# Patient Record
Sex: MALE | Race: WHITE | NOT HISPANIC OR LATINO | Employment: OTHER | URBAN - NONMETROPOLITAN AREA
[De-identification: names, ages, dates, MRNs, and addresses within clinical notes are randomized per-mention and may not be internally consistent; named-entity substitution may affect disease eponyms.]

---

## 2022-02-24 ENCOUNTER — OFFICE VISIT (OUTPATIENT)
Dept: CARDIOLOGY | Facility: CLINIC | Age: 47
End: 2022-02-24

## 2022-02-24 VITALS
DIASTOLIC BLOOD PRESSURE: 96 MMHG | SYSTOLIC BLOOD PRESSURE: 176 MMHG | HEART RATE: 62 BPM | HEIGHT: 69 IN | BODY MASS INDEX: 29.27 KG/M2 | OXYGEN SATURATION: 99 % | WEIGHT: 197.6 LBS

## 2022-02-24 DIAGNOSIS — R00.2 PALPITATIONS: ICD-10-CM

## 2022-02-24 DIAGNOSIS — R06.02 SHORTNESS OF BREATH: ICD-10-CM

## 2022-02-24 DIAGNOSIS — R01.1 HEART MURMUR: ICD-10-CM

## 2022-02-24 DIAGNOSIS — I10 PRIMARY HYPERTENSION: Primary | ICD-10-CM

## 2022-02-24 PROCEDURE — 99204 OFFICE O/P NEW MOD 45 MIN: CPT | Performed by: NURSE PRACTITIONER

## 2022-02-24 RX ORDER — NEBIVOLOL 20 MG/1
TABLET ORAL DAILY
COMMUNITY
Start: 2022-02-18

## 2022-02-24 RX ORDER — LANOLIN ALCOHOL/MO/W.PET/CERES
1000 CREAM (GRAM) TOPICAL DAILY
COMMUNITY

## 2022-02-24 RX ORDER — VALSARTAN 320 MG/1
TABLET ORAL DAILY
COMMUNITY
Start: 2022-02-04

## 2022-02-24 RX ORDER — AMLODIPINE BESYLATE 10 MG/1
TABLET ORAL DAILY
COMMUNITY
Start: 2022-02-17

## 2022-02-24 NOTE — PROGRESS NOTES
Subjective     Pankaj Nicholas is a 46 y.o. male who presents to day for Establish Care (presents for cardiac eval), Hypertension, Heart Murmur, and Shortness of Breath.    CHIEF COMPLIANT  Chief Complaint   Patient presents with   • Establish Care     presents for cardiac eval   • Hypertension   • Heart Murmur   • Shortness of Breath       Active Problems:  Problem List Items Addressed This Visit     None      Visit Diagnoses     Primary hypertension    -  Primary    Relevant Medications    amLODIPine (NORVASC) 10 MG tablet    valsartan (DIOVAN) 320 MG tablet    nebivolol (BYSTOLIC) 20 MG tablet    Other Relevant Orders    Adult Transthoracic Echo Complete W/ Cont if Necessary Per Protocol    Shortness of breath        Relevant Orders    Adult Transthoracic Echo Complete W/ Cont if Necessary Per Protocol    Heart murmur        Relevant Orders    Adult Transthoracic Echo Complete W/ Cont if Necessary Per Protocol    Palpitations        Relevant Orders    Adult Transthoracic Echo Complete W/ Cont if Necessary Per Protocol      Problem list  1.  Hypertension  2.  Heart murmur  3.  Shortness of breath    HPI  HPI  Mr. Nicholas is a 46-year-old male patient who is being seen today to establish care for cardiac evaluation due to hypertension, heart murmur, and shortness of breath.  Patient does have chronic arterial hypertension in which he is currently on valsartan, Bystolic, and amlodipine.  His blood pressure continues to be elevated 176/96 heart rate is 62.  He says prior to the last blood pressure medication titration his blood pressure is 202/1 centimeters at his PCPs.  Proximally 1 week ago she switched him from metoprolol to Bystolic.  I did provide him a log and advised him to follow-up with his PCP within 1 week as well return a log to me.  He does have a murmur in which she has been evaluated in the remote past.  He does not remember any significant valvular disease at that time.  However based on assessment his  murmur is gotten louder.  Therefore a repeat echocardiogram is warranted.  He does have some rare palpitations that mainly occur with exertion in which she has some intermittent racing in his chest.  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  Overall he says he is doing relatively well from a cardiovascular standpoint denies any angina anginal equivalent symptoms.  He denies chest pain, lower extremity edema, fatigue, dizziness, lightheadedness, syncope, PND, orthopnea, or strokelike symptoms.  PRIOR MEDS  Current Outpatient Medications on File Prior to Visit   Medication Sig Dispense Refill   • amLODIPine (NORVASC) 10 MG tablet Daily.     • nebivolol (BYSTOLIC) 20 MG tablet Daily.     • ProAir  (90 Base) MCG/ACT inhaler      • valsartan (DIOVAN) 320 MG tablet Daily.     • vitamin B-12 (CYANOCOBALAMIN) 1000 MCG tablet Take 1,000 mcg by mouth Daily.       No current facility-administered medications on file prior to visit.       ALLERGIES  Patient has no known allergies.    HISTORY  Past Medical History:   Diagnosis Date   • Asthma    • Heart murmur    • Hypertension        Social History     Socioeconomic History   • Marital status:    Tobacco Use   • Smoking status: Current Every Day Smoker     Years: 20.00     Types: Cigarettes   • Smokeless tobacco: Never Used   Substance and Sexual Activity   • Drug use: Never   • Sexual activity: Never       Family History   Problem Relation Age of Onset   • Hypertension Mother    • Hypertension Father        Review of Systems   Constitutional: Negative.  Negative for chills, diaphoresis, fatigue and fever.   HENT: Negative.    Eyes: Negative.  Negative for visual disturbance (wears glasses).   Respiratory: Positive for shortness of breath (on exertion). Negative for apnea, cough, chest tightness and wheezing.         [place]   Cardiovascular: Negative.  Negative for chest pain,  "palpitations (racing only if exerting ) and leg swelling.   Gastrointestinal: Negative.  Negative for abdominal pain, blood in stool, constipation, diarrhea, nausea and vomiting.   Endocrine: Negative.    Genitourinary: Negative.  Negative for hematuria.   Musculoskeletal: Positive for back pain. Negative for arthralgias, myalgias and neck pain.   Skin: Negative.    Allergic/Immunologic: Negative.    Neurological: Negative.  Negative for dizziness, syncope, weakness, light-headedness, numbness and headaches.   Hematological: Negative.  Does not bruise/bleed easily.   Psychiatric/Behavioral: Negative.  Negative for sleep disturbance.       Objective     VITALS: /96 (BP Location: Left arm, Patient Position: Sitting)   Pulse 62   Ht 175.3 cm (69\")   Wt 89.6 kg (197 lb 9.6 oz)   SpO2 99%   BMI 29.18 kg/m²     LABS:   Lab Results (most recent)     None          IMAGING:   No Images in the past 120 days found..    EXAM:  Physical Exam  Vitals and nursing note reviewed.   Constitutional:       Appearance: He is well-developed.   HENT:      Head: Normocephalic and atraumatic.   Eyes:      Pupils: Pupils are equal, round, and reactive to light.   Neck:      Vascular: No carotid bruit or JVD.   Cardiovascular:      Rate and Rhythm: Normal rate and regular rhythm.      Pulses:           Carotid pulses are 2+ on the right side and 2+ on the left side.       Radial pulses are 2+ on the right side and 2+ on the left side.        Posterior tibial pulses are 2+ on the right side and 2+ on the left side.      Heart sounds: Murmur heard.   No gallop.    Pulmonary:      Effort: Pulmonary effort is normal. No respiratory distress.      Breath sounds: Normal breath sounds.   Abdominal:      General: Bowel sounds are normal. There is no distension.      Palpations: Abdomen is soft.      Tenderness: There is no abdominal tenderness.   Musculoskeletal:         General: No swelling. Normal range of motion.      Cervical back: " Neck supple.   Skin:     General: Skin is warm and dry.   Neurological:      Mental Status: He is alert and oriented to person, place, and time.      Cranial Nerves: No cranial nerve deficit.      Sensory: No sensory deficit.   Psychiatric:         Speech: Speech normal.         Behavior: Behavior normal.         Thought Content: Thought content normal.         Judgment: Judgment normal.         Procedure   Procedures       Assessment/Plan    Diagnosis Plan   1. Primary hypertension  Adult Transthoracic Echo Complete W/ Cont if Necessary Per Protocol   2. Shortness of breath  Adult Transthoracic Echo Complete W/ Cont if Necessary Per Protocol   3. Heart murmur  Adult Transthoracic Echo Complete W/ Cont if Necessary Per Protocol   4. Palpitations  Adult Transthoracic Echo Complete W/ Cont if Necessary Per Protocol   1.  Patient's blood pressure is extremely elevated today.  He is a statin medication titrations by his PCP.  I did give him a log to monitor his blood pressure over the next week.  He is going to be following up with her for further titration in 1 week.  If he does not follow-up with her I did recommend he notify me and returning the log for further titration of his antihypertensive therapy.  2.  Due to patient's heart murmur, hypertension, shortness of breath, palpitations I do feel it is appropriate to evaluate her with an echocardiogram.  We will try to obtain his echocardiogram from the remote past for comparison.  3.  Informed of signs and symptoms of ACS and advised to seek emergent treatment for any new worsening symptoms.  Patient also advised sooner follow-up as needed.  Also advised to follow-up with family doctor as needed  This note is dictated utilizing voice recognition software.  Although this record has been proof read, transcriptional errors may still be present. If questions occur regarding the content of this record please do not hesitate to call our office.  I have reviewed and  confirmed the accuracy of the ROS as documented by the MA/LPN/RN NONI Horton    Return in about 3 months (around 5/24/2022), or if symptoms worsen or fail to improve.    Diagnoses and all orders for this visit:    1. Primary hypertension (Primary)  -     Adult Transthoracic Echo Complete W/ Cont if Necessary Per Protocol; Future    2. Shortness of breath  -     Adult Transthoracic Echo Complete W/ Cont if Necessary Per Protocol; Future    3. Heart murmur  -     Adult Transthoracic Echo Complete W/ Cont if Necessary Per Protocol; Future    4. Palpitations  -     Adult Transthoracic Echo Complete W/ Cont if Necessary Per Protocol; Future                 MEDS ORDERED DURING VISIT:  No orders of the defined types were placed in this encounter.          This document has been electronically signed by NONI Horton Jr.  February 24, 2022 13:35 EST

## 2022-02-24 NOTE — PATIENT INSTRUCTIONS
Acute Coronary Syndrome  Acute coronary syndrome (ACS) is a serious problem in which there is suddenly not enough blood and oxygen reaching the heart. ACS can result in chest pain or a heart attack.  This condition is a medical emergency. If you have any symptoms of this condition, get help right away.  What are the causes?  This condition may be caused by:  · A buildup of fat and cholesterol inside the arteries (atherosclerosis). This is the most common cause. The buildup (plaque) can cause blood vessels in the heart (coronary arteries) to become narrow or blocked, which reduces blood flow to the heart. Plaque can also break off and lead to a clot, which can block an artery and cause a heart attack or stroke.  · Sudden tightening of the muscles around the coronary arteries (coronary spasm).  · Tearing of a coronary artery (spontaneous coronary artery dissection).  · Very low blood pressure (hypotension).  · An abnormal heartbeat (arrhythmia).  · Other medical conditions that cause a decrease of oxygen to the heart, such as anemiaorrespiratory failure.  · Using cocaine or methamphetamine.  What increases the risk?  The following factors may make you more likely to develop this condition:  · Age. The risk for ACS increases as you get older.  · History of chest pain, heart attack, peripheral artery disease, or stroke.  · Having taken chemotherapy or immune-suppressing medicines.  · Being male.  · Family history of chest pain, heart disease, or stroke.  · Smoking.  · Not exercising enough.  · Being overweight.  · High cholesterol.  · High blood pressure (hypertension).  · Diabetes.  · Excessive alcohol use.  What are the signs or symptoms?  Common symptoms of this condition include:  · Chest pain. The pain may last a long time, or it may stop and come back (recur). It may feel like:  ? Crushing or squeezing.  ? Tightness, pressure, fullness, or heaviness.  · Arm, neck, jaw, or back pain.  · Heartburn or  indigestion.  · Shortness of breath.  · Nausea.  · Sudden cold sweats.  · Light-headedness.  · Dizziness or passing out.  · Tiredness (fatigue).  Sometimes there are no symptoms.  How is this diagnosed?  This condition may be diagnosed based on:  · Your medical history and symptoms.  · Imaging tests, such as:  ? An electrocardiogram (ECG). This measures the heart's electrical activity.  ? X-rays.  ? CT scan.  ? A coronary angiogram. For this test, dye is injected into the heart arteries and then X-rays are taken.  ? Myocardial perfusion imaging. This test shows how well blood flows through your heart muscle.  · Blood tests. These may be repeated at certain time intervals.  · Exercise stress testing.  · Echocardiogram. This is a test that uses sound waves to produce detailed images of the heart.  How is this treated?  Treatment for this condition may include:  · Oxygen therapy.  · Medicines, such as:  ? Antiplatelet medicines and blood-thinning medicines, such as aspirin. These help prevent blood clots.  ? Medicine that dissolves any blood clots (fibrinolytic therapy).  ? Blood pressure medicines.  ? Nitroglycerin. This helps widen blood vessels to improve blood flow.  ? Pain medicine.  ? Cholesterol-lowering medicine.  · Surgery, such as:  ? Coronary angioplasty with stent placement. This involves placing a small piece of metal that looks like mesh or a spring into a narrow coronary artery. This widens the artery and keeps it open.  ? Coronary artery bypass surgery. This involves taking a section of a blood vessel from a different part of your body and placing it on the blocked coronary artery to allow blood to flow around the blockage.  · Cardiac rehabilitation. This is a program that includes exercise training, education, and counseling to help you recover.  Follow these instructions at home:  Eating and drinking  · Eat a heart-healthy diet that includes whole grains, fruits and vegetables, lean proteins, and  low-fat or nonfat dairy products.  · Limit how much salt (sodium) you eat as told by your health care provider. Follow instructions from your health care provider about any other eating or drinking restrictions, such as limiting foods that are high in fat and processed sugars.  · Use healthy cooking methods such as roasting, grilling, broiling, baking, poaching, steaming, or stir-frying.  · Work with a dietitian to follow a heart-healthy eating plan.  Medicines  · Take over-the-counter and prescription medicines only as told by your health care provider.  · Do not take these medicines unless your health care provider approves:  ? Vitamin supplements that contain vitamin A or vitamin E.  ? NSAIDs, such as ibuprofen, naproxen, or celecoxib.  ? Hormone replacement therapy that contains estrogen.  · If you are taking blood thinners:  ? Talk with your health care provider before you take any medicines that contain aspirin or NSAIDs. These medicines increase your risk for dangerous bleeding.  ? Take your medicine exactly as told, at the same time every day.  ? Avoid activities that could cause injury or bruising, and follow instructions about how to prevent falls.  ? Wear a medical alert bracelet, and carry a card that lists what medicines you take.  Activity  · Follow your cardiac rehabilitation program. Do exercises as told by your physical therapist.  · Ask your health care provider what activities and exercises are safe for you. Follow his or her instructions about lifting, driving, or climbing stairs.  Lifestyle  · Do not use any products that contain nicotine or tobacco, such as cigarettes, e-cigarettes, and chewing tobacco. If you need help quitting, ask your health care provider.  · Do not drink alcohol if your health care provider tells you not to drink.  · If you drink alcohol:  ? Limit how much you have to 0-1 drink a day.  ? Be aware of how much alcohol is in your drink. In the [place] one drink equals one 12 oz  bottle of beer (355 mL), one 5 oz glass of wine (148 mL), or one 1½ oz glass of hard liquor (44 mL).  · Maintain a healthy weight. If you need to lose weight, work with your health care provider to do so safely.  General instructions  · Tell all the health care providers who provide care for you about your heart condition, including your dentist. This may affect the medicines or treatment you receive.  · Manage any other health conditions you have, such as hypertension or diabetes. These conditions affect your heart.  · Pay attention to your mental health. You may be at higher risk for depression.  ? Find ways to manage stress.  ? Talk to your health care provider about depression screening and treatment.  · Keep your vaccinations up to date.  ? Get the flu shot (influenza vaccine) every year.  ? Get the pneumococcal vaccine if you are age 65 or older.  · If directed, monitor your blood pressure at home.  · Keep all follow-up visits as told by your health care provider. This is important.  Contact a health care provider if you:  · Feel overwhelmed or sad.  · Have trouble doing your daily activities.  Get help right away if you:  · Have pain in your chest, neck, arm, jaw, stomach, or back that recurs, and:  ? It lasts for more than a few minutes.  ? It is not relieved by taking the medicineyour health care provider prescribed.  · Have unexplained:  ? Heavy sweating.  ? Heartburn or indigestion.  ? Nausea or vomiting.  ? Shortness of breath.  ? Difficulty breathing.  ? Fatigue.  ? Nervousness or anxiety.  ? Weakness.  ? Diarrhea.  ? Dark stools or blood in your stool.  · Have sudden light-headedness or dizziness.  · Have blood pressure that is higher than 180/120.  · Faint.  · Have thoughts about hurting yourself.  These symptoms may represent a serious problem that is an emergency. Do not wait to see if the symptoms will go away. Get medical help right away. Call your local emergency services (911 in the [place] Do  not drive yourself to the hospital.   Summary  · Acute coronary syndrome (ACS) is when there is not enough blood and oxygen being supplied to the heart. ACS can result in chest pain or a heart attack.  · Acute coronary syndrome is a medical emergency. If you have any symptoms of this condition, get help right away.  · Treatment includes medicines and procedures to open the blocked arteries and restore blood flow.  This information is not intended to replace advice given to you by your health care provider. Make sure you discuss any questions you have with your health care provider.  Document Revised: 05/20/2020 Document Reviewed: 12/30/2019  [email or web address] Patient Education © 2021 [email or web address]      Hypertension, Adult  Hypertension is another name for high blood pressure. High blood pressure forces your heart to work harder to pump blood. This can cause problems over time.  There are two numbers in a blood pressure reading. There is a top number (systolic) over a bottom number (diastolic). It is best to have a blood pressure that is below 120/80. Healthy choices can help lower your blood pressure, or you may need medicine to help lower it.  What are the causes?  The cause of this condition is not known. Some conditions may be related to high blood pressure.  What increases the risk?  · Smoking.  · Having type 2 diabetes mellitus, high cholesterol, or both.  · Not getting enough exercise or physical activity.  · Being overweight.  · Having too much fat, sugar, calories, or salt (sodium) in your diet.  · Drinking too much alcohol.  · Having long-term (chronic) kidney disease.  · Having a family history of high blood pressure.  · Age. Risk increases with age.  · Race. You may be at higher risk if you are .  · Gender. Men are at higher risk than women before age 45. After age 65, women are at higher risk than men.  · Having obstructive sleep apnea.  · Stress.  What are the signs or symptoms?  · High blood  pressure may not cause symptoms. Very high blood pressure (hypertensive crisis) may cause:  ? Headache.  ? Feelings of worry or nervousness (anxiety).  ? Shortness of breath.  ? Nosebleed.  ? A feeling of being sick to your stomach (nausea).  ? Throwing up (vomiting).  ? Changes in how you see.  ? Very bad chest pain.  ? Seizures.  How is this treated?  · This condition is treated by making healthy lifestyle changes, such as:  ? Eating healthy foods.  ? Exercising more.  ? Drinking less alcohol.  · Your health care provider may prescribe medicine if lifestyle changes are not enough to get your blood pressure under control, and if:  ? Your top number is above 130.  ? Your bottom number is above 80.  · Your personal target blood pressure may vary.  Follow these instructions at home:  Eating and drinking    · If told, follow the DASH eating plan. To follow this plan:  ? Fill one half of your plate at each meal with fruits and vegetables.  ? Fill one fourth of your plate at each meal with whole grains. Whole grains include whole-wheat pasta, brown rice, and whole-grain bread.  ? Eat or drink low-fat dairy products, such as skim milk or low-fat yogurt.  ? Fill one fourth of your plate at each meal with low-fat (lean) proteins. Low-fat proteins include fish, chicken without skin, eggs, beans, and tofu.  ? Avoid fatty meat, cured and processed meat, or chicken with skin.  ? Avoid pre-made or processed food.  · Eat less than 1,500 mg of salt each day.  · Do not drink alcohol if:  ? Your doctor tells you not to drink.  ? You are pregnant, may be pregnant, or are planning to become pregnant.  · If you drink alcohol:  ? Limit how much you use to:  § 0-1 drink a day for women.  § 0-2 drinks a day for men.  ? Be aware of how much alcohol is in your drink. In the [place] one drink equals one 12 oz bottle of beer (355 mL), one 5 oz glass of wine (148 mL), or one 1½ oz glass of hard liquor (44 mL).    Lifestyle    · Work with your  doctor to stay at a healthy weight or to lose weight. Ask your doctor what the best weight is for you.  · Get at least 30 minutes of exercise most days of the week. This may include walking, swimming, or biking.  · Get at least 30 minutes of exercise that strengthens your muscles (resistance exercise) at least 3 days a week. This may include lifting weights or doing Pilates.  · Do not use any products that contain nicotine or tobacco, such as cigarettes, e-cigarettes, and chewing tobacco. If you need help quitting, ask your doctor.  · Check your blood pressure at home as told by your doctor.  · Keep all follow-up visits as told by your doctor. This is important.    Medicines  · Take over-the-counter and prescription medicines only as told by your doctor. Follow directions carefully.  · Do not skip doses of blood pressure medicine. The medicine does not work as well if you skip doses. Skipping doses also puts you at risk for problems.  · Ask your doctor about side effects or reactions to medicines that you should watch for.  Contact a doctor if you:  · Think you are having a reaction to the medicine you are taking.  · Have headaches that keep coming back (recurring).  · Feel dizzy.  · Have swelling in your ankles.  · Have trouble with your vision.  Get help right away if you:  · Get a very bad headache.  · Start to feel mixed up (confused).  · Feel weak or numb.  · Feel faint.  · Have very bad pain in your:  ? Chest.  ? Belly (abdomen).  · Throw up more than once.  · Have trouble breathing.  Summary  · Hypertension is another name for high blood pressure.  · High blood pressure forces your heart to work harder to pump blood.  · For most people, a normal blood pressure is less than 120/80.  · Making healthy choices can help lower blood pressure. If your blood pressure does not get lower with healthy choices, you may need to take medicine.  This information is not intended to replace advice given to you by your health  "care provider. Make sure you discuss any questions you have with your health care provider.  Document Revised: 08/28/2019 Document Reviewed: 08/28/2019  [employer] Patient Education © 2021 [employer]      Heart Murmur  A heart murmur is an extra sound that is caused by chaotic blood flow through the valves of the heart. The murmur can be heard as a \"hum\" or \"whoosh\" sound when blood flows through the heart.  There are two types of heart murmurs:  · Innocent (benign) murmurs. Most people with this type of heart murmur do not have a heart problem. Many children have innocent heart murmurs. Your health care provider may suggest some basic tests to find out whether your murmur is an innocent murmur. If an innocent heart murmur is found, there is no need for further tests or treatment and no need to restrict activities or stop playing sports.  · Abnormal murmurs. These types of murmurs can occur in children and adults. Abnormal murmurs may be a sign of a more serious heart condition, such as a heart defect present at birth (congenital defect) or heart valve disease.  What are the causes?    The heart has four areas called chambers. Valves separate the upper and lower chambers from each other (tricuspid valve and mitral valve) and separate the lower chambers of the heart from pathways that lead away from the heart (aortic valve and pulmonary valve).  Normally, the valves open to let blood flow through or out of your heart, and then they shut to keep the blood from flowing backward. This condition is caused by heart valves that are not working properly.  · In children, abnormal heart murmurs are typically caused by congenital defects.  · In adults, abnormal murmurs are usually caused by heart valve problems from disease, infection, or aging.  This condition may also be caused by:  · Pregnancy.  · Fever.  · Overactive thyroid gland.  · Anemia.  · Exercise.  · Rapid growth spurts (in children).  What are the signs or " symptoms?  Innocent murmurs do not cause symptoms, and many people with abnormal murmurs may not have symptoms. If symptoms do develop, they may include:  · Shortness of breath.  · Blue coloring of the skin, especially on the fingertips.  · Chest pain.  · Palpitations, or feeling a fluttering or skipped heartbeat.  · Fainting.  · Persistent cough.  · Getting tired much faster than expected.  · Swelling in the abdomen, feet, or ankles.  How is this diagnosed?  This condition may be diagnosed during a routine physical or other exam. If your health care provider hears a murmur with a stethoscope, he or she will listen for:  · Where the murmur is located in your heart.  · How long the murmur lasts (duration).  · When the murmur is heard during the heartbeat.  · How loud the murmur is. This may help the health care provider figure out what is causing the murmur.  You may be referred to a heart specialist (cardiologist). You may also have other tests, including:  · Electrocardiogram (ECG or EKG). This test measures the electrical activity of your heart.  · Echocardiogram. This test uses high frequency sound waves to make pictures of your heart.  · MRI or chest X-ray.  · Cardiac catheterization. This test looks at blood flow through the arteries around the heart.  For children and adults who have an abnormal heart murmur and want to stay active, it is important to:  · Complete testing.  · Review test results.  · Receive recommendations from your health care provider.  If heart disease is present, it may not be safe to play or be active.  How is this treated?  Heart murmurs themselves do not need treatment. In some cases, a heart murmur may go away on its own. If an underlying problem or disease is causing the murmur, you may need treatment. If treatment is needed, it will depend on the type and severity of the disease or heart problem causing the murmur. Treatment may include:  · Medicine.  · Surgery.  · Dietary and  lifestyle changes.  Follow these instructions at home:  · Talk with your health care provider before participating in sports or other activities that require a lot of effort and energy (are strenuous).  · Learn as much as possible about your condition and any related diseases. Ask your health care provider if you may be at risk for any medical emergencies.  · Talk with your health care provider about what symptoms you should look out for.  · It is up to you to get your test results. Ask your health care provider, or the department that is doing the test, when your results will be ready.  · Keep all follow-up visits as told by your health care provider. This is important.  Contact a health care provider if:  · You are frequently short of breath.  · You feel more tired than usual.  · You are having a hard time keeping up with normal activities or fitness routines.  · You have swelling in your ankles or feet.  · You notice that your heart often beats irregularly.  · You develop any new symptoms.  Get help right away if:  · You have chest pain.  · You are having trouble breathing.  · You feel light-headed or you pass out.  · Your symptoms suddenly get worse.  These symptoms may represent a serious problem that is an emergency. Do not wait to see if the symptoms will go away. Get medical help right away. Call your local emergency services (911 in the [place] Do not drive yourself to the hospital.  Summary  · Normally, the heart valves open to let blood flow through or out of your heart, and then they shut to keep the blood from flowing backward.  · A heart murmur is caused by heart valves that are not working properly.  · You may need treatment if an underlying problem or disease is causing the heart murmur. Treatment may include medicine, surgery, or dietary and lifestyle changes.  · Talk with your health care provider before participating in sports or other activities that require a lot of effort and energy (are  strenuous).  · Talk with your health care provider about what symptoms you should watch out for.  This information is not intended to replace advice given to you by your health care provider. Make sure you discuss any questions you have with your health care provider.  Document Revised: 06/11/2019 Document Reviewed: 06/11/2019  Elsevier Patient Education © 2021 [employer]

## 2022-03-31 ENCOUNTER — HOSPITAL ENCOUNTER (OUTPATIENT)
Dept: CARDIOLOGY | Facility: HOSPITAL | Age: 47
Discharge: HOME | End: 2022-03-31
Admitting: NURSE PRACTITIONER

## 2022-03-31 DIAGNOSIS — I51.7 MODERATE CONCENTRIC LEFT VENTRICULAR HYPERTROPHY: ICD-10-CM

## 2022-03-31 DIAGNOSIS — I10 PRIMARY HYPERTENSION: ICD-10-CM

## 2022-03-31 DIAGNOSIS — R01.1 HEART MURMUR: ICD-10-CM

## 2022-03-31 DIAGNOSIS — I51.89 GRADE II DIASTOLIC DYSFUNCTION: ICD-10-CM

## 2022-03-31 DIAGNOSIS — R06.02 SHORTNESS OF BREATH: ICD-10-CM

## 2022-03-31 DIAGNOSIS — R00.2 PALPITATIONS: ICD-10-CM

## 2022-03-31 PROCEDURE — 93306 TTE W/DOPPLER COMPLETE: CPT | Performed by: INTERNAL MEDICINE

## 2022-03-31 PROCEDURE — 93306 TTE W/DOPPLER COMPLETE: CPT

## 2022-04-10 LAB
BH CV ECHO MEAS - ACS: 2.44 CM
BH CV ECHO MEAS - AO MAX PG: 11 MMHG
BH CV ECHO MEAS - AO MEAN PG: 6.2 MMHG
BH CV ECHO MEAS - AO ROOT DIAM: 3.5 CM
BH CV ECHO MEAS - AO V2 MAX: 165.6 CM/SEC
BH CV ECHO MEAS - AO V2 VTI: 37.4 CM
BH CV ECHO MEAS - AVA(I,D): 3.4 CM2
BH CV ECHO MEAS - EDV(CUBED): 56.3 ML
BH CV ECHO MEAS - EDV(MOD-SP4): 80.8 ML
BH CV ECHO MEAS - EF(MOD-SP4): 56.8 %
BH CV ECHO MEAS - EF_3D-VOL: 63 %
BH CV ECHO MEAS - ESV(CUBED): 5.4 ML
BH CV ECHO MEAS - ESV(MOD-SP4): 34.9 ML
BH CV ECHO MEAS - FS: 54.3 %
BH CV ECHO MEAS - IVS/LVPW: 0.66 CM
BH CV ECHO MEAS - IVSD: 1.67 CM
BH CV ECHO MEAS - LA DIMENSION: 5 CM
BH CV ECHO MEAS - LAT PEAK E' VEL: 6 CM/SEC
BH CV ECHO MEAS - LV DIASTOLIC VOL/BSA (35-75): 39.4 CM2
BH CV ECHO MEAS - LV MASS(C)D: 383.4 GRAMS
BH CV ECHO MEAS - LV MAX PG: 9.1 MMHG
BH CV ECHO MEAS - LV MEAN PG: 4.9 MMHG
BH CV ECHO MEAS - LV SYSTOLIC VOL/BSA (12-30): 17 CM2
BH CV ECHO MEAS - LV V1 MAX: 150.8 CM/SEC
BH CV ECHO MEAS - LV V1 VTI: 35.1 CM
BH CV ECHO MEAS - LVIDD: 3.8 CM
BH CV ECHO MEAS - LVIDS: 1.75 CM
BH CV ECHO MEAS - LVOT AREA: 3.6 CM2
BH CV ECHO MEAS - LVOT DIAM: 2.14 CM
BH CV ECHO MEAS - LVPWD: 2.5 CM
BH CV ECHO MEAS - MED PEAK E' VEL: 4.4 CM/SEC
BH CV ECHO MEAS - MV A MAX VEL: 102.8 CM/SEC
BH CV ECHO MEAS - MV DEC SLOPE: 321 CM/SEC2
BH CV ECHO MEAS - MV E MAX VEL: 118 CM/SEC
BH CV ECHO MEAS - MV E/A: 1.15
BH CV ECHO MEAS - MV MAX PG: 7.6 MMHG
BH CV ECHO MEAS - MV MEAN PG: 2.49 MMHG
BH CV ECHO MEAS - MV P1/2T: 131 MSEC
BH CV ECHO MEAS - MV V2 VTI: 58.7 CM
BH CV ECHO MEAS - MVA(P1/2T): 1.7 CM2
BH CV ECHO MEAS - MVA(VTI): 2.16 CM2
BH CV ECHO MEAS - RVDD: 2.8 CM
BH CV ECHO MEAS - SI(MOD-SP4): 22.4 ML/M2
BH CV ECHO MEAS - SV(LVOT): 126.7 ML
BH CV ECHO MEAS - SV(MOD-SP4): 45.9 ML
BH CV ECHO MEASUREMENTS AVERAGE E/E' RATIO: 22.69
LEFT ATRIUM VOLUME INDEX: 30.4 ML/M2
MAXIMAL PREDICTED HEART RATE: 176 BPM
STRESS TARGET HR: 150 BPM

## 2022-04-19 DIAGNOSIS — I51.89 GRADE II DIASTOLIC DYSFUNCTION: ICD-10-CM

## 2022-04-19 DIAGNOSIS — I51.7 MODERATE CONCENTRIC LEFT VENTRICULAR HYPERTROPHY: Primary | ICD-10-CM

## 2022-04-20 ENCOUNTER — TELEPHONE (OUTPATIENT)
Dept: CARDIOLOGY | Facility: CLINIC | Age: 47
End: 2022-04-20

## 2022-04-20 NOTE — TELEPHONE ENCOUNTER
Cardiac MRI to be scheduled due to Abn Echo. Called patient and notified of testing and Echo results.    Elizabeth SEO

## 2022-06-01 ENCOUNTER — OFFICE VISIT (OUTPATIENT)
Dept: CARDIOLOGY | Facility: CLINIC | Age: 47
End: 2022-06-01

## 2022-06-01 VITALS
OXYGEN SATURATION: 98 % | BODY MASS INDEX: 29.77 KG/M2 | HEIGHT: 69 IN | HEART RATE: 59 BPM | SYSTOLIC BLOOD PRESSURE: 179 MMHG | WEIGHT: 201 LBS | DIASTOLIC BLOOD PRESSURE: 88 MMHG

## 2022-06-01 DIAGNOSIS — R00.2 PALPITATIONS: ICD-10-CM

## 2022-06-01 DIAGNOSIS — I51.89 GRADE II DIASTOLIC DYSFUNCTION: Primary | ICD-10-CM

## 2022-06-01 DIAGNOSIS — I10 PRIMARY HYPERTENSION: ICD-10-CM

## 2022-06-01 DIAGNOSIS — R01.1 HEART MURMUR: ICD-10-CM

## 2022-06-01 DIAGNOSIS — I51.7 MODERATE CONCENTRIC LEFT VENTRICULAR HYPERTROPHY: ICD-10-CM

## 2022-06-01 DIAGNOSIS — R06.02 SHORTNESS OF BREATH: ICD-10-CM

## 2022-06-01 PROCEDURE — 99214 OFFICE O/P EST MOD 30 MIN: CPT | Performed by: NURSE PRACTITIONER

## 2022-06-01 RX ORDER — CHLORTHALIDONE 25 MG/1
25 TABLET ORAL DAILY
Qty: 30 TABLET | Refills: 5 | Status: SHIPPED | OUTPATIENT
Start: 2022-06-01 | End: 2022-06-01

## 2022-06-01 RX ORDER — CHLORTHALIDONE 25 MG/1
25 TABLET ORAL DAILY
Qty: 90 TABLET | Refills: 3 | Status: SHIPPED | OUTPATIENT
Start: 2022-06-01 | End: 2022-09-12

## 2022-06-01 NOTE — PROGRESS NOTES
Subjective     Pankaj Nicholas is a 47 y.o. male who presents to day for Hypertension and 3 month follow up / testing.    CHIEF COMPLIANT  Chief Complaint   Patient presents with   • Hypertension   • 3 month follow up / testing       Active Problems:  Problem List Items Addressed This Visit    None     Visit Diagnoses     Grade II diastolic dysfunction    -  Primary    Relevant Orders    Home Sleep Study    Metanephrines, Frac. Free, Plasma    Metanephrines, Urine, 24 Hour - Urine, Clean Catch    TSH    Moderate concentric left ventricular hypertrophy        Relevant Orders    Home Sleep Study    Metanephrines, Frac. Free, Plasma    Metanephrines, Urine, 24 Hour - Urine, Clean Catch    TSH    Primary hypertension        Relevant Medications    chlorthalidone (HYGROTON) 25 MG tablet    Other Relevant Orders    Home Sleep Study    Metanephrines, Frac. Free, Plasma    Metanephrines, Urine, 24 Hour - Urine, Clean Catch    TSH    Shortness of breath        Relevant Orders    Home Sleep Study    Metanephrines, Frac. Free, Plasma    Metanephrines, Urine, 24 Hour - Urine, Clean Catch    TSH    Heart murmur        Relevant Orders    Home Sleep Study    Metanephrines, Frac. Free, Plasma    Metanephrines, Urine, 24 Hour - Urine, Clean Catch    TSH    Palpitations        Relevant Orders    Home Sleep Study    Metanephrines, Frac. Free, Plasma    Metanephrines, Urine, 24 Hour - Urine, Clean Catch    TSH      Problem list  1.  Hypertension  Echocardiogram 3/22: EF 65 to 70%, grade 2 diastolic dysfunction, moderate to severe dominantly concentric left ventricular hypertrophy with small septal ridge immediately below the LV outflow tract.  Mild amount of Blaine mild MR physiological TR  2.  Heart murmur  3.  Shortness of breath    HPI  HPI  Mr. Nicholas is a 47-year-old male patient who is being followed up today for chronic hypertension.  Patient was noted to have moderate to severe dominantly concentric left ventricular hypertrophy with  "a small \"septal ridge\" immediately below the LV outflow tract.  There is no significant late systolic flow acceleration on Doppler sampling and color Doppler demonstrates no significant LV outflow tract turbulence in systole.  However, M-mode demonstrates a mild degree of systolic anterior motion of the mitral valve.  No focal wall motion abnormalities.  Grade 2 diastolic dysfunction.  Mild to moderate left atrial enlargement.  The right atrium is at the upper limits of normal size.  RV size and function are preserved.  An MRI of the heart has been ordered in which patient is awaiting to have performed.  His blood pressure continues to be elevated today at 179/88 heart rate of 59.  On recheck 164/84.  He is currently on amlodipine, Bystolic, and valsartan.  He does report persistent shortness of breath that mainly occurs with activity such as walking around.  It does not occur at rest.  This stable and unchanged.  He also reports significant fatigue where he is tired all the time and does have daytime napping.  He also reports report of snoring and a strong family history of sleep apnea.  He denies any chest pain, lower extremity edema, palpitations, dizziness, lightheadedness, syncope, PND, orthopnea, or strokelike symptoms.                PRIOR MEDS  Current Outpatient Medications on File Prior to Visit   Medication Sig Dispense Refill   • amLODIPine (NORVASC) 10 MG tablet Daily.     • nebivolol (BYSTOLIC) 20 MG tablet Daily.     • ProAir  (90 Base) MCG/ACT inhaler      • valsartan (DIOVAN) 320 MG tablet Daily.     • vitamin B-12 (CYANOCOBALAMIN) 1000 MCG tablet Take 1,000 mcg by mouth Daily.       No current facility-administered medications on file prior to visit.       ALLERGIES  Patient has no known allergies.    HISTORY  Past Medical History:   Diagnosis Date   • Asthma    • Heart murmur    • Hypertension        Social History     Socioeconomic History   • Marital status:    Tobacco Use   • " "Smoking status: Current Every Day Smoker     Years: 20.00     Types: Cigarettes   • Smokeless tobacco: Never Used   Substance and Sexual Activity   • Drug use: Never   • Sexual activity: Never       Family History   Problem Relation Age of Onset   • Hypertension Mother    • Hypertension Father        Review of Systems   Constitutional: Positive for fatigue. Negative for chills and fever.   HENT: Negative for congestion, rhinorrhea and sore throat.    Respiratory: Positive for shortness of breath. Negative for chest tightness.    Cardiovascular: Negative for chest pain, palpitations and leg swelling.   Gastrointestinal: Negative for constipation, diarrhea and nausea.   Musculoskeletal: Positive for back pain. Negative for arthralgias and neck pain.   Allergic/Immunologic: Negative for environmental allergies and food allergies.   Neurological: Positive for weakness. Negative for dizziness, syncope and light-headedness.   Hematological: Does not bruise/bleed easily.   Psychiatric/Behavioral: Negative for sleep disturbance.       Objective     VITALS: /88 (BP Location: Left arm, Patient Position: Sitting)   Pulse 59   Ht 175.3 cm (69.02\")   Wt 91.2 kg (201 lb)   SpO2 98%   BMI 29.67 kg/m²     LABS:   Lab Results (most recent)     None          IMAGING:   No Images in the past 120 days found..    EXAM:  Physical Exam  Vitals and nursing note reviewed.   Constitutional:       Appearance: He is well-developed.   HENT:      Head: Normocephalic and atraumatic.   Eyes:      Pupils: Pupils are equal, round, and reactive to light.   Neck:      Vascular: No carotid bruit or JVD.   Cardiovascular:      Rate and Rhythm: Normal rate and regular rhythm.      Pulses:           Carotid pulses are 2+ on the right side and 2+ on the left side.       Radial pulses are 2+ on the right side and 2+ on the left side.        Posterior tibial pulses are 2+ on the right side and 2+ on the left side.      Heart sounds: Murmur heard. "     No gallop.   Pulmonary:      Effort: Pulmonary effort is normal. No respiratory distress.      Breath sounds: Normal breath sounds.   Abdominal:      General: Bowel sounds are normal. There is no distension.      Palpations: Abdomen is soft.      Tenderness: There is no abdominal tenderness.   Musculoskeletal:         General: No swelling. Normal range of motion.      Cervical back: Neck supple.   Skin:     General: Skin is warm and dry.   Neurological:      Mental Status: He is alert and oriented to person, place, and time.      Cranial Nerves: No cranial nerve deficit.      Sensory: No sensory deficit.   Psychiatric:         Speech: Speech normal.         Behavior: Behavior normal.         Thought Content: Thought content normal.         Judgment: Judgment normal.         Procedure   Procedures       Assessment & Plan    Diagnosis Plan   1. Grade II diastolic dysfunction  Home Sleep Study    Metanephrines, Frac. Free, Plasma    Metanephrines, Urine, 24 Hour - Urine, Clean Catch    TSH   2. Moderate concentric left ventricular hypertrophy  Home Sleep Study    Metanephrines, Frac. Free, Plasma    Metanephrines, Urine, 24 Hour - Urine, Clean Catch    TSH   3. Primary hypertension  Home Sleep Study    Metanephrines, Frac. Free, Plasma    Metanephrines, Urine, 24 Hour - Urine, Clean Catch    TSH    chlorthalidone (HYGROTON) 25 MG tablet   4. Shortness of breath  Home Sleep Study    Metanephrines, Frac. Free, Plasma    Metanephrines, Urine, 24 Hour - Urine, Clean Catch    TSH   5. Heart murmur  Home Sleep Study    Metanephrines, Frac. Free, Plasma    Metanephrines, Urine, 24 Hour - Urine, Clean Catch    TSH   6. Palpitations  Home Sleep Study    Metanephrines, Frac. Free, Plasma    Metanephrines, Urine, 24 Hour - Urine, Clean Catch    TSH   1.  Patient does have significant chronic arterial hypertension with moderate concentric left ventricular hypertrophy with grade 2 diastolic dysfunction.  Currently on extensive  medication therapy with max doses of amlodipine, Bystolic, and valsartan.  Therefore I like to look for secondary causes of hypertension including pheochromocytoma, hyperthyroidism, and renal artery stenosis as well as sleep apnea.  2.  Due to patient's grade 2 diastolic dysfunction and increased blood pressure we will add chlorthalidone 25 mg daily to see if we can better control his blood pressure.  He is given a log in which she will return in 2 weeks and further blood pressure medication titration will take place at that time.  Or sooner if needed.  3.  Patient is awaiting MRI for abnormal echocardiogram did advise him to continue to follow through with MRI.  4.  I would like to order home sleep study due to uncontrolled hypertension, chronic fatigue, snoring, and strong family history of sleep apnea.  5.  Informed of signs and symptoms of ACS and advised to seek emergent treatment for any new worsening symptoms.  Patient also advised sooner follow-up as needed.  Also advised to follow-up with family doctor as needed  This note is dictated utilizing voice recognition software.  Although this record has been proof read, transcriptional errors may still be present. If questions occur regarding the content of this record please do not hesitate to call our office.  I have reviewed and confirmed the accuracy of the ROS as documented by the MA/LPN/RN NONI Horton    Return in about 3 months (around 9/1/2022), or if symptoms worsen or fail to improve.    Diagnoses and all orders for this visit:    1. Grade II diastolic dysfunction (Primary)  -     Home Sleep Study; Future  -     Cancel: US Renal Transplant; Future  -     Metanephrines, Frac. Free, Plasma; Future  -     Metanephrines, Urine, 24 Hour - Urine, Clean Catch; Future  -     TSH; Future    2. Moderate concentric left ventricular hypertrophy  -     Home Sleep Study; Future  -     Cancel: US Renal Transplant; Future  -     Metanephrines, Frac. Free,  Plasma; Future  -     Metanephrines, Urine, 24 Hour - Urine, Clean Catch; Future  -     TSH; Future    3. Primary hypertension  -     Home Sleep Study; Future  -     Cancel: US Renal Transplant; Future  -     Metanephrines, Frac. Free, Plasma; Future  -     Metanephrines, Urine, 24 Hour - Urine, Clean Catch; Future  -     TSH; Future  -     chlorthalidone (HYGROTON) 25 MG tablet; Take 1 tablet by mouth Daily.  Dispense: 90 tablet; Refill: 3    4. Shortness of breath  -     Home Sleep Study; Future  -     Cancel: US Renal Transplant; Future  -     Metanephrines, Frac. Free, Plasma; Future  -     Metanephrines, Urine, 24 Hour - Urine, Clean Catch; Future  -     TSH; Future    5. Heart murmur  -     Home Sleep Study; Future  -     Cancel: US Renal Transplant; Future  -     Metanephrines, Frac. Free, Plasma; Future  -     Metanephrines, Urine, 24 Hour - Urine, Clean Catch; Future  -     TSH; Future    6. Palpitations  -     Home Sleep Study; Future  -     Cancel: US Renal Transplant; Future  -     Metanephrines, Frac. Free, Plasma; Future  -     Metanephrines, Urine, 24 Hour - Urine, Clean Catch; Future  -     TSH; Future    Other orders  -     Discontinue: chlorthalidone (HYGROTON) 25 MG tablet; Take 1 tablet by mouth Daily.  Dispense: 30 tablet; Refill: 5        Pankaj Nicholas  reports that he has been smoking cigarettes. He has smoked for the past 20.00 years. He has never used smokeless tobacco.. I have educated him on the risk of diseases from using tobacco products .         MEDS ORDERED DURING VISIT:  New Medications Ordered This Visit   Medications   • chlorthalidone (HYGROTON) 25 MG tablet     Sig: Take 1 tablet by mouth Daily.     Dispense:  90 tablet     Refill:  3           This document has been electronically signed by Nilesh Collins Jr., APRN  June 1, 2022 09:51 EDT

## 2022-06-03 ENCOUNTER — HOSPITAL ENCOUNTER (OUTPATIENT)
Dept: MRI IMAGING | Facility: HOSPITAL | Age: 47
End: 2022-06-03

## 2022-07-06 ENCOUNTER — TELEPHONE (OUTPATIENT)
Dept: CARDIOLOGY | Facility: CLINIC | Age: 47
End: 2022-07-06

## 2022-07-06 NOTE — TELEPHONE ENCOUNTER
S/w pt he is coming in next week to  urine collection jug. He will have his labs drown at that time.

## 2022-07-19 ENCOUNTER — TELEPHONE (OUTPATIENT)
Dept: CARDIOLOGY | Facility: CLINIC | Age: 47
End: 2022-07-19

## 2022-07-19 NOTE — TELEPHONE ENCOUNTER
Home Sleep Study      Patient notified of negative HST. He is aware to keep his appointment as scheduled to discuss results entirely.

## 2022-08-03 ENCOUNTER — HOSPITAL ENCOUNTER (OUTPATIENT)
Dept: MRI IMAGING | Facility: HOSPITAL | Age: 47
Discharge: HOME | End: 2022-08-03
Admitting: NURSE PRACTITIONER

## 2022-08-03 PROCEDURE — 75561 CARDIAC MRI FOR MORPH W/DYE: CPT

## 2022-08-03 PROCEDURE — A9577 INJ MULTIHANCE: HCPCS | Performed by: NURSE PRACTITIONER

## 2022-08-03 PROCEDURE — 75561 CARDIAC MRI FOR MORPH W/DYE: CPT | Performed by: NURSE PRACTITIONER

## 2022-08-03 PROCEDURE — 0 GADOBENATE DIMEGLUMINE 529 MG/ML SOLUTION: Performed by: NURSE PRACTITIONER

## 2022-08-03 PROCEDURE — 82565 ASSAY OF CREATININE: CPT

## 2022-08-03 RX ADMIN — GADOBENATE DIMEGLUMINE 20 ML: 529 INJECTION, SOLUTION INTRAVENOUS at 11:39

## 2022-08-10 ENCOUNTER — TELEPHONE (OUTPATIENT)
Dept: CARDIOLOGY | Facility: CLINIC | Age: 47
End: 2022-08-10

## 2022-08-10 NOTE — TELEPHONE ENCOUNTER
Nilesh Collins, Genny Dior MA  MRI is does show some left ventricular hypertrophy.  No acute findings.  Preserved systolic function.  Keep follow-up.        I called the patient and advised of above results . He will monitor blood pressure closely.    Elizabeth SEO

## 2022-08-16 LAB — CREAT BLDA-MCNC: 1.9 MG/DL (ref 0.6–1.3)

## 2022-08-17 ENCOUNTER — TELEPHONE (OUTPATIENT)
Dept: CARDIOLOGY | Facility: CLINIC | Age: 47
End: 2022-08-17

## 2022-08-17 DIAGNOSIS — R79.89 ELEVATED SERUM CREATININE: Primary | ICD-10-CM

## 2022-08-17 DIAGNOSIS — I51.89 GRADE II DIASTOLIC DYSFUNCTION: ICD-10-CM

## 2022-08-17 DIAGNOSIS — I51.7 MODERATE CONCENTRIC LEFT VENTRICULAR HYPERTROPHY: ICD-10-CM

## 2022-08-17 DIAGNOSIS — R06.02 SHORTNESS OF BREATH: ICD-10-CM

## 2022-08-17 NOTE — PROGRESS NOTES
Patient's creatinine had increased from 1.16 previously now to 1.90.  I would recommend discontinuation of the chlorthalidone.  Repeat creatinine in 2 weeks.

## 2022-08-17 NOTE — TELEPHONE ENCOUNTER
Nilesh Collins APRN [place], [place], [place]  Patient's creatinine had increased from 1.16 previously now to 1.90.  I would recommend discontinuation of the chlorthalidone.  Repeat creatinine in 2 weeks.     I called and left a detailed message on voicemail of above results.     Elizabeth SEO

## 2022-09-12 ENCOUNTER — OFFICE VISIT (OUTPATIENT)
Dept: CARDIOLOGY | Facility: CLINIC | Age: 47
End: 2022-09-12

## 2022-09-12 VITALS
HEART RATE: 69 BPM | HEIGHT: 69 IN | OXYGEN SATURATION: 98 % | BODY MASS INDEX: 29.92 KG/M2 | DIASTOLIC BLOOD PRESSURE: 106 MMHG | WEIGHT: 202 LBS | SYSTOLIC BLOOD PRESSURE: 200 MMHG

## 2022-09-12 DIAGNOSIS — I51.89 GRADE II DIASTOLIC DYSFUNCTION: ICD-10-CM

## 2022-09-12 DIAGNOSIS — I51.7 MODERATE CONCENTRIC LEFT VENTRICULAR HYPERTROPHY: ICD-10-CM

## 2022-09-12 DIAGNOSIS — I10 PRIMARY HYPERTENSION: Primary | ICD-10-CM

## 2022-09-12 DIAGNOSIS — R01.1 HEART MURMUR: ICD-10-CM

## 2022-09-12 DIAGNOSIS — R79.89 ELEVATED SERUM CREATININE: ICD-10-CM

## 2022-09-12 PROCEDURE — 93000 ELECTROCARDIOGRAM COMPLETE: CPT | Performed by: NURSE PRACTITIONER

## 2022-09-12 PROCEDURE — 99214 OFFICE O/P EST MOD 30 MIN: CPT | Performed by: NURSE PRACTITIONER

## 2022-09-12 RX ORDER — CLONIDINE HYDROCHLORIDE 0.1 MG/1
0.1 TABLET ORAL ONCE
Status: SHIPPED | OUTPATIENT
Start: 2022-09-12

## 2022-09-12 RX ORDER — NAPROXEN SODIUM 220 MG
220 TABLET ORAL DAILY PRN
COMMUNITY

## 2022-09-12 RX ORDER — HYDRALAZINE HYDROCHLORIDE 25 MG/1
25 TABLET, FILM COATED ORAL 3 TIMES DAILY
Qty: 90 TABLET | Refills: 6 | Status: SHIPPED | OUTPATIENT
Start: 2022-09-12

## 2022-09-12 NOTE — PROGRESS NOTES
Subjective     Pankaj Nicholas is a 47 y.o. male who presents to day for 3 month follow up , Hypertension (Clonidine 0.1 mg given at 9:23 RC RMA), and grade II diastolic dysfunction.    CHIEF COMPLIANT  Chief Complaint   Patient presents with   • 3 month follow up    • Hypertension     Clonidine 0.1 mg given at 9:23 RC RMA   • grade II diastolic dysfunction       Active Problems:  Problem List Items Addressed This Visit    None     Visit Diagnoses     Primary hypertension    -  Primary    Relevant Medications    cloNIDine (CATAPRES) tablet 0.1 mg    hydrALAZINE (APRESOLINE) 25 MG tablet    Other Relevant Orders    ECG 12 Lead    Comprehensive Metabolic Panel    Catecholamines, Fractionated, Plasma    Catecholamines, Fractionated, Urine, 24 Hour - Urine, Clean Catch    CBC & Differential    TSH    Magnesium    Duplex Renal Artery - Bilateral Complete CAR    Grade II diastolic dysfunction        Relevant Orders    ECG 12 Lead    Moderate concentric left ventricular hypertrophy        Relevant Orders    ECG 12 Lead    Heart murmur        Relevant Orders    ECG 12 Lead    Elevated serum creatinine        Relevant Orders    ECG 12 Lead                Problem list  1.  Hypertension  Echocardiogram 3/22: EF 65 to 70%, grade 2 diastolic dysfunction, moderate to severe dominantly concentric left ventricular hypertrophy with small septal ridge immediately below the LV outflow tract.  Mild amount of Blaine mild MR physiological TR  1.2 cardiac MRI: Apical variant hypertrophic cardiomyopathy, EF 63%  2.  Heart murmur  3.  Shortness of breath      HPI  Mr. Pankaj Nicholas, 47-year-old male, medical record number [id number], being followed up today for chronic arterial hypertension. Today, his blood pressure is significantly elevated, 200/106mmHg, heart rate of 69. He is currently taking valsartan, Bystolic, and amlodipine. He denies any associated symptoms with his chronic arterial hypertension.    The patient presents today, stating  his blood pressure at home is not as elevated, running around 180/80-90mmHg. The patient denies difficulty with chest pain, shortness of breath, headaches, dizziness, lightheadedness, syncope. The patient states he was previously taking chlorthalidone, but after having elevated kidney functions, Dr. Du Solano discontinued the medication. The patient reports he does not see a nephrologist. The patient's blood pressure in the office after resting was 169/86mmHg, heart rate of 67. The patient reports he was referred to a cardiologist because he was told he has had a systolic murmur.    Patient denies chest pain, shortness of breath, lower extremity edema, palpitations, fatigue, dizziness, lightheadedness, syncope, PND, orthopnea, or strokelike symptoms.      PRIOR MEDS  Current Outpatient Medications on File Prior to Visit   Medication Sig Dispense Refill   • amLODIPine (NORVASC) 10 MG tablet Daily.     • naproxen sodium (ALEVE) 220 MG tablet Take 220 mg by mouth Daily As Needed.     • nebivolol (BYSTOLIC) 20 MG tablet Daily.     • ProAir  (90 Base) MCG/ACT inhaler      • valsartan (DIOVAN) 320 MG tablet Daily.     • vitamin B-12 (CYANOCOBALAMIN) 1000 MCG tablet Take 1,000 mcg by mouth Daily.       No current facility-administered medications on file prior to visit.       ALLERGIES  Patient has no known allergies.    HISTORY  Past Medical History:   Diagnosis Date   • Asthma    • Heart murmur    • Hypertension        Social History     Socioeconomic History   • Marital status:    Tobacco Use   • Smoking status: Current Every Day Smoker     Years: 20.00     Types: Cigarettes   • Smokeless tobacco: Never Used   Substance and Sexual Activity   • Drug use: Never   • Sexual activity: Never       Family History   Problem Relation Age of Onset   • Hypertension Mother    • Hypertension Father        Review of Systems   Constitutional: Negative for chills, fatigue and fever.   HENT: Negative for  "congestion, rhinorrhea and sore throat.    Respiratory: Negative for chest tightness and shortness of breath.    Cardiovascular: Negative for chest pain, palpitations and leg swelling.   Gastrointestinal: Negative for constipation, diarrhea and nausea.   Musculoskeletal: Positive for back pain. Negative for arthralgias and neck pain.   Allergic/Immunologic: Negative for environmental allergies and food allergies.   Neurological: Negative for dizziness, syncope, weakness and light-headedness.   Hematological: Does not bruise/bleed easily.   Psychiatric/Behavioral: Negative for sleep disturbance.       Objective     VITALS: BP (!) 200/106 (BP Location: Left arm, Patient Position: Sitting)   Pulse 69   Ht 175.3 cm (69.02\")   Wt 91.6 kg (202 lb)   SpO2 98%   BMI 29.82 kg/m²     LABS:   Lab Results (most recent)     None          IMAGING:   No Images in the past 120 days found..    EXAM:  Physical Exam  Vitals and nursing note reviewed.   Constitutional:       Appearance: He is well-developed.   HENT:      Head: Normocephalic and atraumatic.   Eyes:      Pupils: Pupils are equal, round, and reactive to light.   Neck:      Vascular: No carotid bruit or JVD.   Cardiovascular:      Rate and Rhythm: Normal rate and regular rhythm.      Pulses:           Carotid pulses are 2+ on the right side and 2+ on the left side.       Radial pulses are 2+ on the right side and 2+ on the left side.        Posterior tibial pulses are 2+ on the right side and 2+ on the left side.      Heart sounds: Murmur heard.     No gallop.   Pulmonary:      Effort: Pulmonary effort is normal. No respiratory distress.      Breath sounds: Normal breath sounds.   Abdominal:      General: Bowel sounds are normal. There is no distension.      Palpations: Abdomen is soft.      Tenderness: There is no abdominal tenderness.   Musculoskeletal:         General: Swelling (trace) present. Normal range of motion.      Cervical back: Neck supple.   Skin:     " General: Skin is warm and dry.   Neurological:      Mental Status: He is alert and oriented to person, place, and time.      Cranial Nerves: No cranial nerve deficit.      Sensory: No sensory deficit.   Psychiatric:         Speech: Speech normal.         Behavior: Behavior normal.         Thought Content: Thought content normal.         Judgment: Judgment normal.         Procedure     ECG 12 Lead    Date/Time: 9/12/2022 9:14 AM  Performed by: Nilesh Collins APRN  Authorized by: Nilesh Collins APRN   Previous ECG: no previous ECG available  Rhythm: sinus rhythm  Rate: normal  BPM: 66  QRS axis: normal  Other findings: non-specific ST-T wave changes and left ventricular hypertrophy  Comments:  ms  No acute changes               Assessment & Plan    Diagnosis Plan   1. Primary hypertension  ECG 12 Lead    cloNIDine (CATAPRES) tablet 0.1 mg    Comprehensive Metabolic Panel    Catecholamines, Fractionated, Plasma    Catecholamines, Fractionated, Urine, 24 Hour - Urine, Clean Catch    CBC & Differential    TSH    Magnesium    Duplex Renal Artery - Bilateral Complete CAR    hydrALAZINE (APRESOLINE) 25 MG tablet   2. Grade II diastolic dysfunction  ECG 12 Lead   3. Moderate concentric left ventricular hypertrophy  ECG 12 Lead   4. Heart murmur  ECG 12 Lead   5. Elevated serum creatinine  ECG 12 Lead   Plan   1. Patient continues to have chronic arterial hypertension. Patient advised to monitor his blood pressure on a daily basis and report his blood pressure to me weekly. .Added hydralazine 25 mg three times daily sent the 30-day supply to [facility]. Advised the patient to contact me in  mychart if any adjustments are needed in the hydralazine as soon as possible.  Patient will return a log in 2 weeks of his blood pressures.   3.The patient will have laboratory studies obtained including thyroid stimulating hormone, Pheochromocytoma 24-hour urine, and kidney function test to monitor the patient's  thyroid, kidney function levels, and adrenal glands.  4. The patient will have a renal ultrasound to elevate the blockage due to  drug-resistant hypertension  5.  Informed of signs and symptoms of ACS and advised to seek emergent treatment for any new worsening symptoms.  Patient also advised sooner follow-up as needed.  Also advised to follow-up with family doctor as needed  This note is dictated utilizing voice recognition software.  Although this record has been proof read, transcriptional errors may still be present. If questions occur regarding the content of this record please do not hesitate to call our office.  I have reviewed and confirmed the accuracy of the ROS as documented by the MA/LPN/RN NONI Horton.     Return in about 3 months (around 12/12/2022), or if symptoms worsen or fail to improve, for BP logs weekly.    Diagnoses and all orders for this visit:    1. Primary hypertension (Primary)  -     ECG 12 Lead  -     cloNIDine (CATAPRES) tablet 0.1 mg  -     Comprehensive Metabolic Panel; Future  -     Catecholamines, Fractionated, Plasma; Future  -     Catecholamines, Fractionated, Urine, 24 Hour - Urine, Clean Catch; Future  -     CBC & Differential; Future  -     TSH; Future  -     Magnesium; Future  -     Duplex Renal Artery - Bilateral Complete CAR; Future  -     hydrALAZINE (APRESOLINE) 25 MG tablet; Take 1 tablet by mouth 3 (Three) Times a Day.  Dispense: 90 tablet; Refill: 6    2. Grade II diastolic dysfunction  -     ECG 12 Lead    3. Moderate concentric left ventricular hypertrophy  -     ECG 12 Lead    4. Heart murmur  -     ECG 12 Lead    5. Elevated serum creatinine  -     ECG 12 Lead        Pankaj Nicholas  reports that he has been smoking cigarettes. He has smoked for the past 20.00 years. He has never used smokeless tobacco.. I have educated him on the risk of diseases from using tobacco products.            The patient will follow-up in 3 months.      MEDS ORDERED DURING  VISIT:  New Medications Ordered This Visit   Medications   • cloNIDine (CATAPRES) tablet 0.1 mg   • hydrALAZINE (APRESOLINE) 25 MG tablet     Sig: Take 1 tablet by mouth 3 (Three) Times a Day.     Dispense:  90 tablet     Refill:  6           This document has been electronically signed by NONI Horton Jr.  September 19, 2022 00:18 EDT     Transcribed from ambient dictation for NONI Horton by Michaela Zhao .  09/12/22   14:34 EDT    Patient verbalized consent to the visit recording.  I have personally performed the services described in this document as transcribed by the above individual, and it is both accurate and complete.  NONI Horton  9/19/2022  00:19 EDT

## 2022-09-21 ENCOUNTER — TELEPHONE (OUTPATIENT)
Dept: CARDIOLOGY | Facility: CLINIC | Age: 47
End: 2022-09-21

## 2022-09-21 NOTE — TELEPHONE ENCOUNTER
Nilesh Collins, Genny Dior MA  No renal artery stenosis.  Accidental findings of gallstones in the gallbladder      I called patient advised of test results and will forward report to PCP.    Elizabeth SEO

## 2022-09-23 ENCOUNTER — TELEPHONE (OUTPATIENT)
Dept: CARDIOLOGY | Facility: CLINIC | Age: 47
End: 2022-09-23

## 2022-09-23 NOTE — TELEPHONE ENCOUNTER
----- Message from NONI Horton sent at 9/22/2022  4:25 PM EDT -----  Regarding: FW: BP chart  I would like to increase patient's hydralazine to 50 mg 3 times daily have him continue to monitor his blood pressure.    ----- Message -----  From: Aziza Hartley RegSched Rep  Sent: 9/21/2022   5:55 PM EDT  To: NONI Horton, Genny Deng MA  Subject: FW: BP chart                                       ----- Message -----  From: Pankaj Nicholas  Sent: 9/20/2022  11:19 PM EDT  To: Joellen Meraz [facility]  Subject: BP chart                                         Sorry it took some time to get thus to you I had to get a new monitor.

## 2022-11-16 ENCOUNTER — TELEPHONE (OUTPATIENT)
Dept: CARDIOLOGY | Facility: CLINIC | Age: 47
End: 2022-11-16

## 2022-11-16 NOTE — TELEPHONE ENCOUNTER
I called and left a message for patient about upcoming apt and need for labs before this apt.     Elizabeth Deng A

## 2022-11-21 ENCOUNTER — TELEPHONE (OUTPATIENT)
Dept: CARDIOLOGY | Facility: CLINIC | Age: 47
End: 2022-11-21

## 2022-11-21 NOTE — TELEPHONE ENCOUNTER
Caller: Pankaj Nicholas    Relationship: Self    Best call back number: [phone]    What orders are you requesting (i.e. lab or imaging): COPY OF LAB ORDERS    In what timeframe would the patient need to come in: PT IS GOING TO GET LABS DONE TOMORROW 11.22.22    Where will you receive your lab/imaging services: PCP OFFICE:  [facility]  [facility]     Additional notes: PT IS WANTING TO GET LABS DONE FOR UPCOMING APPT COMPLETED TOMORROW BUT WANTS TO HAVE THEM DONE AT HIS PCP OFFICE - UNABLE TO LOCATE FAX NUMBER BUT OFFICE NUMBER IS [phone]

## 2022-12-14 ENCOUNTER — TELEPHONE (OUTPATIENT)
Dept: CARDIOLOGY | Facility: CLINIC | Age: 47
End: 2022-12-14

## 2022-12-14 NOTE — TELEPHONE ENCOUNTER
For the HUB to read to pt:       Left message for pt to confirm appt, if pt calls back please put call through, thank you.

## 2023-04-18 ENCOUNTER — TELEPHONE (OUTPATIENT)
Dept: CARDIOLOGY | Facility: CLINIC | Age: 48
End: 2023-04-18

## 2023-04-18 NOTE — TELEPHONE ENCOUNTER
Caller: ZARINA- [facility]    Relationship: Provider    Best call back number: [phone]    What form or medical record are you requesting: LAST OFFICE NOTE/ EKG    Who is requesting this form or medical record from you: [facility]    How would you like to receive the form or medical records (pick-up, mail, fax): FAX  If fax, what is the fax number: [phone]    Timeframe paperwork needed: ASAP    Additional notes: PT IS IN ER AND THE PRACTICE IS REQUESTING LAST OFFICE NOTE AND LAST EKG

## 2023-04-21 ENCOUNTER — TELEPHONE (OUTPATIENT)
Dept: CARDIOLOGY | Facility: CLINIC | Age: 48
End: 2023-04-21

## 2023-04-21 NOTE — TELEPHONE ENCOUNTER
Caller: Pankaj Nicholas    Relationship to patient: Self    Best call back number:[phone] CALL ANYTIME AND OK TO LEAVE VM    Chief complaint: PATIENT JUST RELEASED FROM [facility] NEEDING A HOSPITAL FOLLOW UP VISIT.    Type of visit: HOSPITAL FOLLOW UP    Requested date: 7-10 DAYS      PLEASE REACH OUT TO PATIENT TO SCHEDULE.  HE LAST SAW KEY CHI JR, NP IN 9-2022

## 2023-04-27 ENCOUNTER — OFFICE VISIT (OUTPATIENT)
Dept: CARDIOLOGY | Facility: CLINIC | Age: 48
End: 2023-04-27
Payer: COMMERCIAL

## 2023-04-27 VITALS
WEIGHT: 200.8 LBS | OXYGEN SATURATION: 97 % | HEART RATE: 64 BPM | DIASTOLIC BLOOD PRESSURE: 68 MMHG | HEIGHT: 69 IN | BODY MASS INDEX: 29.74 KG/M2 | SYSTOLIC BLOOD PRESSURE: 112 MMHG

## 2023-04-27 DIAGNOSIS — I10 PRIMARY HYPERTENSION: Primary | ICD-10-CM

## 2023-04-27 DIAGNOSIS — I51.7 MODERATE CONCENTRIC LEFT VENTRICULAR HYPERTROPHY: ICD-10-CM

## 2023-04-27 DIAGNOSIS — R79.89 ELEVATED SERUM CREATININE: ICD-10-CM

## 2023-04-27 DIAGNOSIS — R01.1 HEART MURMUR: ICD-10-CM

## 2023-04-27 RX ORDER — NITROGLYCERIN 0.4 MG/1
0.4 TABLET SUBLINGUAL
COMMUNITY

## 2023-04-27 RX ORDER — PRASUGREL 10 MG/1
10 TABLET, FILM COATED ORAL DAILY
Qty: 90 TABLET | Refills: 3 | Status: SHIPPED | OUTPATIENT
Start: 2023-04-27

## 2023-04-27 RX ORDER — ASPIRIN 81 MG/1
81 TABLET ORAL DAILY
COMMUNITY

## 2023-04-27 RX ORDER — ATORVASTATIN CALCIUM 40 MG/1
40 TABLET, FILM COATED ORAL DAILY
COMMUNITY
End: 2023-04-27 | Stop reason: SDUPTHER

## 2023-04-27 RX ORDER — HYDRALAZINE HYDROCHLORIDE 25 MG/1
75 TABLET, FILM COATED ORAL 3 TIMES DAILY
Qty: 810 TABLET | Refills: 3 | Status: SHIPPED | OUTPATIENT
Start: 2023-04-27

## 2023-04-27 RX ORDER — ATORVASTATIN CALCIUM 40 MG/1
40 TABLET, FILM COATED ORAL DAILY
Qty: 90 TABLET | Refills: 3 | Status: SHIPPED | OUTPATIENT
Start: 2023-04-27

## 2023-04-27 RX ORDER — PRASUGREL 10 MG/1
10 TABLET, FILM COATED ORAL DAILY
COMMUNITY
End: 2023-04-27 | Stop reason: SDUPTHER

## 2023-04-27 RX ORDER — CELECOXIB 200 MG/1
200 CAPSULE ORAL DAILY
Qty: 90 CAPSULE | Refills: 1 | Status: SHIPPED | OUTPATIENT
Start: 2023-04-27

## 2023-04-27 NOTE — PROGRESS NOTES
Subjective     Pankaj Nicholas is a 48 y.o. male who presents to [facility] for [facility] ER follow up  and Hypertension.    CHIEF COMPLIANT  Chief Complaint   Patient presents with   • [facility] ER follow up    • Hypertension       Active Problems:  Problem List Items Addressed This Visit    None  Visit Diagnoses     Primary hypertension    -  Primary    Relevant Medications    hydrALAZINE (APRESOLINE) 25 MG tablet    Other Relevant Orders    Comprehensive Metabolic Panel    Lipid Panel    Moderate concentric left ventricular hypertrophy        Relevant Medications    nitroglycerin (NITROSTAT) 0.4 MG SL tablet    prasugrel (EFFIENT) 10 MG tablet    Other Relevant Orders    Comprehensive Metabolic Panel    Lipid Panel    Heart murmur        Relevant Orders    Comprehensive Metabolic Panel    Lipid Panel    Elevated serum creatinine        Relevant Orders    Comprehensive Metabolic Panel    Lipid Panel      Problem list  1.  Hypertension  Echocardiogram 3/22: EF 65 to 70%, grade 2 diastolic dysfunction, moderate to severe dominantly concentric left ventricular hypertrophy with small septal ridge immediately below the LV outflow tract.  Mild amount of Blaine mild MR physiological TR  1.2 cardiac MRI: Apical variant hypertrophic cardiomyopathy, EF 63%  2.  Heart murmur  3.  Shortness of breath  4.  Left heart catheterization 4/2023 left main patent, LAD proximal 95%, OM1 70%, circumflex 30 to 40%, RCA mid mild disease, LVEDP 36, stent placement to the LAD and OM1    HPI  Hypertension  Associated symptoms include chest pain (Has bewen to [facility] twice recently, denies CP since most recent release ). Pertinent negatives include no headaches, palpitations or shortness of breath.     Panakj Nicholas is a 58-year-old patient is being followed up today status post hospital follow-up. He did go under left heart catheterization during the first visit. In which he got a stent to his RCA and a stent to his OM. He had recurrent chest pain and sought care  for a second time and went under repeat left heart catheterization, which did not show any hemodynamically significant stenosis. He also had a repeat echocardiogram that showed an EF of 60 to 65 percent, diastolic dysfunction, mild TR and trace TR. He is on atorvastatin for high intensity statin therapy, Effient, and aspirin for dual antiplatelet therapy.     He does have chronic arterial hypertension in which is on amlodipine, hydralazine, Bystolic, and valsartan. Today, his blood pressure is well controlled at 112/68 mmHg. Heart rate of 64 beats per minute. He reports his blood pressure systolic is usually around 130 mmHg or 140 mmHg.    He denies shortness of breath or chest pains. He denies any swelling. He reports he has constant back pain, some days hurt worse then others. He adds he usually gets up and takes 2 Aleve's.    He reports he was doing good then one day he went into work and had a lot of pain. He adds when he went to the doctor they did not find anything. He notes the doctor looked over his kidneys as well. He adds they started him on atorvastatin. He states he discontinued the Brilinta because they believed it was part of the problem. They switched him to Effient. He states he is on nitroglycerin.    The patient maintains his wrist is still stiff but overall doing good. He denies any numbness or tingling. He notes everything tingles until he takes the band off.    He states when he went to the ER they had admitted him because his troponin level was elevated.  He had a repeat left heart catheterization that showed patent stents.    Patient denies any chest pain, shortness of breath, lower extremity edema, palpitations, fatigue, dizziness, lightheadedness, PND, orthopnea, or strokelike symptoms.    PRIOR MEDS  Current Outpatient Medications on File Prior to Visit   Medication Sig Dispense Refill   • amLODIPine (NORVASC) 10 MG tablet Daily.     • aspirin 81 MG EC tablet Take 1 tablet by mouth Daily.      • nebivolol (BYSTOLIC) 20 MG tablet Daily.     • nitroglycerin (NITROSTAT) 0.4 MG SL tablet Place 1 tablet under the tongue Every 5 (Five) Minutes As Needed for Chest Pain. Take no more than 3 doses in 15 minutes.     • ProAir  (90 Base) MCG/ACT inhaler      • valsartan (DIOVAN) 320 MG tablet Daily.     • [DISCONTINUED] atorvastatin (LIPITOR) 40 MG tablet Take 1 tablet by mouth Daily.     • [DISCONTINUED] hydrALAZINE (APRESOLINE) 25 MG tablet Take 1 tablet by mouth 3 (Three) Times a Day. (Patient taking differently: Take 1 tablet by mouth 3 (Three) Times a Day. 3 tabs TID) 90 tablet 6   • [DISCONTINUED] prasugrel (EFFIENT) 10 MG tablet Take 1 tablet by mouth Daily.     • [DISCONTINUED] naproxen sodium (ALEVE) 220 MG tablet Take 1 tablet by mouth Daily As Needed.     • [DISCONTINUED] vitamin B-12 (CYANOCOBALAMIN) 1000 MCG tablet Take 1 tablet by mouth Daily.       Current Facility-Administered Medications on File Prior to Visit   Medication Dose Route Frequency Provider Last Rate Last Admin   • cloNIDine (CATAPRES) tablet 0.1 mg  0.1 mg Oral Once Nilesh Collins APRN           ALLERGIES  Patient has no known allergies.    HISTORY  Past Medical History:   Diagnosis Date   • Asthma    • Heart murmur    • Hypertension        Social History     Socioeconomic History   • Marital status:    Tobacco Use   • Smoking status: Every Day     Years: 20.00     Types: Cigarettes   • Smokeless tobacco: Never   Substance and Sexual Activity   • Drug use: Never   • Sexual activity: Never        Family History   Problem Relation Age of Onset   • Hypertension Mother    • Hypertension Father        Review of Systems   Constitutional: Negative for fatigue.   HENT: Negative for congestion, rhinorrhea and sore throat.    Respiratory: Negative for chest tightness and shortness of breath.    Cardiovascular: Positive for chest pain (Has bewen to [facility] twice recently, denies CP since most recent release ). Negative for  "palpitations and leg swelling.   Gastrointestinal: Negative.    Genitourinary: Negative.    Neurological: Negative for dizziness, syncope, weakness, light-headedness, numbness and headaches.   Hematological: Does not bruise/bleed easily.   Psychiatric/Behavioral: Negative for sleep disturbance.       Objective     VITALS: /68   Pulse 64   Ht 175.3 cm (69\")   Wt 91.1 kg (200 lb 12.8 oz)   SpO2 97%   BMI 29.65 kg/m²     LABS:   Lab Results (most recent)     None        Blood work showed CBC normal, troponins originally started at 42 and dropped down to 39. kidney function originally was 1.23 went up to 1.41, following the heart catheterization. Triglycerides 107, cholesterol 25, .  GFR normal, calcium, magnesium.      IMAGING:   No Images in the past 120 days found..    Repeat heart catheterization showed 95 percent in the LAD, 70 percent in the OM. Left main normal, circumflex was good. Right coronary artery showed mild disease, nothing major in the midportion, high grade stenosis.    Repeat echocardiogram that showed an EF of 60 to 65 percent, diastolic dysfunction, mild TR and trace TR.    EXAM:  Physical Exam  Vitals and nursing note reviewed.   Constitutional:       Appearance: He is well-developed.   HENT:      Head: Normocephalic and atraumatic.   Eyes:      Pupils: Pupils are equal, round, and reactive to light.   Neck:      Vascular: No carotid bruit or JVD.   Cardiovascular:      Rate and Rhythm: Normal rate and regular rhythm.      Pulses:           Carotid pulses are 2+ on the right side and 2+ on the left side.       Radial pulses are 2+ on the right side and 2+ on the left side.        Posterior tibial pulses are 2+ on the right side and 2+ on the left side.      Heart sounds: Normal heart sounds. No murmur heard.    No gallop.   Pulmonary:      Effort: Pulmonary effort is normal. No respiratory distress.      Breath sounds: Normal breath sounds.   Abdominal:      General: Bowel sounds " are normal. There is no distension.      Palpations: Abdomen is soft.      Tenderness: There is no abdominal tenderness.   Musculoskeletal:         General: No swelling. Normal range of motion.      Cervical back: Neck supple.   Skin:     General: Skin is warm and dry.   Neurological:      Mental Status: He is alert and oriented to person, place, and time.      Cranial Nerves: No cranial nerve deficit.      Sensory: No sensory deficit.   Psychiatric:         Speech: Speech normal.         Behavior: Behavior normal.         Thought Content: Thought content normal.         Judgment: Judgment normal.         Procedure   Procedures       Assessment & Plan    Diagnosis Plan   1. Primary hypertension  Comprehensive Metabolic Panel    Lipid Panel    hydrALAZINE (APRESOLINE) 25 MG tablet      2. Moderate concentric left ventricular hypertrophy  Comprehensive Metabolic Panel    Lipid Panel      3. Heart murmur  Comprehensive Metabolic Panel    Lipid Panel      4. Elevated serum creatinine  Comprehensive Metabolic Panel    Lipid Panel      PLAN  1. The patient's cardiac medications were discussed at length during this visit.   2. The patient was prescribed Celebrex 200 mg daily. He was advised it is not ideal to take it, and only take as needed for back pain.  3. The patient's Effient, atorvastatin, and hydralazine was refilled.  We did discuss the importance of dual antiplatelet therapy due to his placement of 2 stents.  4. An order has been placed for blood work in order to check his kidney function and lipid panel.  5. He was advised to not lift more then a gallon of milk until 04/29/2023 with his right wrist.  Right radial cath site is without hematoma or exudate.  Right radial pulse is palpable proximal and distal to the insertion site.  Patient denies any loss of sensation, numbness, or tingling.  Capillary refill within 2 seconds.  6.  Informed of signs and symptoms of ACS and advised to seek emergent treatment for any  new worsening symptoms.  Patient also advised sooner follow-up as needed.  Also advised to follow-up with family doctor as needed  This note is dictated utilizing voice recognition software.  Although this record has been proof read, transcriptional errors may still be present. If questions occur regarding the content of this record please do not hesitate to call our office.  I have reviewed and confirmed the accuracy of the ROS as documented by the MA/LPN/RN NONI Horton  Return in about 6 months (around 10/27/2023), or if symptoms worsen or fail to improve.    Diagnoses and all orders for this visit:    1. Primary hypertension (Primary)  -     Comprehensive Metabolic Panel; Future  -     Lipid Panel; Future  -     hydrALAZINE (APRESOLINE) 25 MG tablet; Take 3 tablets by mouth 3 (Three) Times a Day. 3 tabs TID  Dispense: 810 tablet; Refill: 3    2. Moderate concentric left ventricular hypertrophy  -     Comprehensive Metabolic Panel; Future  -     Lipid Panel; Future    3. Heart murmur  -     Comprehensive Metabolic Panel; Future  -     Lipid Panel; Future    4. Elevated serum creatinine  -     Comprehensive Metabolic Panel; Future  -     Lipid Panel; Future    Other orders  -     celecoxib (CeleBREX) 200 MG capsule; Take 1 capsule by mouth Daily.  Dispense: 90 capsule; Refill: 1  -     prasugrel (EFFIENT) 10 MG tablet; Take 1 tablet by mouth Daily.  Dispense: 90 tablet; Refill: 3  -     atorvastatin (LIPITOR) 40 MG tablet; Take 1 tablet by mouth Daily.  Dispense: 90 tablet; Refill: 3        Pankaj Nicholas  reports that he has been smoking cigarettes. He has never used smokeless tobacco.. I have educated him on the risk of diseases from using tobacco products.            MEDS ORDERED DURING VISIT:  New Medications Ordered This Visit   Medications   • celecoxib (CeleBREX) 200 MG capsule     Sig: Take 1 capsule by mouth Daily.     Dispense:  90 capsule     Refill:  1   • prasugrel (EFFIENT) 10 MG tablet      Sig: Take 1 tablet by mouth Daily.     Dispense:  90 tablet     Refill:  3   • atorvastatin (LIPITOR) 40 MG tablet     Sig: Take 1 tablet by mouth Daily.     Dispense:  90 tablet     Refill:  3   • hydrALAZINE (APRESOLINE) 25 MG tablet     Sig: Take 3 tablets by mouth 3 (Three) Times a Day. 3 tabs TID     Dispense:  810 tablet     Refill:  3           This document has been electronically signed by NONI Horton Jr.  April 27, 2023 16:03 EDT    Transcribed from ambient dictation for NONI Horton by Ruthann Gusman.  04/27/23   16:05 EDT    Patient or patient representative verbalized consent to the visit recording.  I have personally performed the services described in this document as transcribed by the above individual, and it is both accurate and complete.

## 2023-05-18 RX ORDER — ASPIRIN 81 MG/1
TABLET ORAL
Qty: 30 TABLET | Refills: 0 | Status: SHIPPED | OUTPATIENT
Start: 2023-05-18

## 2023-06-15 RX ORDER — ASPIRIN 81 MG/1
TABLET ORAL
Qty: 30 TABLET | Refills: 0 | Status: SHIPPED | OUTPATIENT
Start: 2023-06-15

## 2023-08-14 RX ORDER — ASPIRIN 81 MG/1
TABLET ORAL
Qty: 30 TABLET | Refills: 0 | Status: SHIPPED | OUTPATIENT
Start: 2023-08-14

## 2023-09-21 RX ORDER — ASPIRIN 81 MG/1
TABLET ORAL
Qty: 30 TABLET | Refills: 0 | Status: SHIPPED | OUTPATIENT
Start: 2023-09-21

## 2023-10-24 ENCOUNTER — TELEPHONE (OUTPATIENT)
Dept: CARDIOLOGY | Facility: CLINIC | Age: 48
End: 2023-10-24
Payer: COMMERCIAL

## 2023-10-24 NOTE — TELEPHONE ENCOUNTER
Caller: Pankaj Nicholas    Relationship: Self    Best call back number: [phone]    What orders are you requesting (i.e. lab or imaging): BLOOD WORK ORDERS    In what timeframe would the patient need to come in: ASAP    Where will you receive your lab/imaging services: [facility]  PHONE NUMBER [phone]    Additional notes:   PATIENT WOULD LIKE FOR BLOOD WORK ORDERS TO BE SENT TO HIS PCP [facility].  PATIENT WOULD LIKE FOR THIS TO BE DONE ASAP AS HE'D LIKE TO GET THEM DONE TODAY.

## 2023-10-27 ENCOUNTER — OFFICE VISIT (OUTPATIENT)
Dept: CARDIOLOGY | Facility: CLINIC | Age: 48
End: 2023-10-27
Payer: COMMERCIAL

## 2023-10-27 VITALS
BODY MASS INDEX: 29.09 KG/M2 | OXYGEN SATURATION: 98 % | WEIGHT: 196.4 LBS | SYSTOLIC BLOOD PRESSURE: 143 MMHG | HEIGHT: 69 IN | HEART RATE: 56 BPM | DIASTOLIC BLOOD PRESSURE: 80 MMHG

## 2023-10-27 DIAGNOSIS — I25.10 CORONARY ARTERY DISEASE INVOLVING NATIVE CORONARY ARTERY OF NATIVE HEART WITHOUT ANGINA PECTORIS: ICD-10-CM

## 2023-10-27 DIAGNOSIS — R01.1 HEART MURMUR: ICD-10-CM

## 2023-10-27 DIAGNOSIS — I10 PRIMARY HYPERTENSION: Primary | ICD-10-CM

## 2023-10-27 PROCEDURE — 1160F RVW MEDS BY RX/DR IN RCRD: CPT | Performed by: NURSE PRACTITIONER

## 2023-10-27 PROCEDURE — 99213 OFFICE O/P EST LOW 20 MIN: CPT | Performed by: NURSE PRACTITIONER

## 2023-10-27 PROCEDURE — 1159F MED LIST DOCD IN RCRD: CPT | Performed by: NURSE PRACTITIONER

## 2023-10-27 RX ORDER — ASPIRIN 81 MG/1
81 TABLET ORAL DAILY
Qty: 90 TABLET | Refills: 1 | Status: SHIPPED | OUTPATIENT
Start: 2023-10-27

## 2023-10-27 NOTE — PROGRESS NOTES
Subjective     Pankaj Nicholas is a 48 y.o. male who presents to day for 6 month follow and Hypertension.    CHIEF COMPLIANT  Chief Complaint   Patient presents with    6 month follow    Hypertension       Active Problems:  Problem List Items Addressed This Visit    None  Visit Diagnoses       Primary hypertension    -  Primary    Coronary artery disease involving native coronary artery of native heart without angina pectoris        Heart murmur            Problem list  1.  Hypertension  Echocardiogram 3/22: EF 65 to 70%, grade 2 diastolic dysfunction, moderate to severe dominantly concentric left ventricular hypertrophy with small septal ridge immediately below the LV outflow tract.  Mild amount of Blaine mild MR physiological TR  1.2 cardiac MRI: Apical variant hypertrophic cardiomyopathy, EF 63%  2.  Heart murmur  3.  Shortness of breath  4.  Left heart catheterization 4/2023 left main patent, LAD proximal 95%, OM1 70%, circumflex 30 to 40%, RCA mid mild disease, LVEDP 36, stent placement to the LAD and OM1    HPI  HPI  Mr. Pankaj Nicholas is a 48-year-old male patient being followed up today for coronary artery disease and chronic arterial hypertension.  Patient does have a history of coronary artery disease when she went under stent placement to the LAD and OM1 in April 2023.  At the same time he had a patent left main, circumflex 30 to 40% RCA mild mid disease, LVEDP was 36 stent placement to the LAD and the obtuse marginal 1.  He is on dual antiplatelet therapy of aspirin and Effient and high intensity statin therapy of atorvastatin.    He does have chronic arterial hypertension as well which she is on hydralazine, Bystolic, amlodipine, and valsartan.  Today's blood pressure is 143/80 heart rate of 56.    The patient reports that he is doing well from a cardiovascular standpoint.  He denies any angina anginal equivalent symptoms.  He denies any chest pain, chest pressure, chest tightness, shortness of breath,  lower extremity edema, palpitations, syncope, dizziness, lightheadedness, or strokelike symptoms.  PRIOR MEDS  Current Outpatient Medications on File Prior to Visit   Medication Sig Dispense Refill    amLODIPine (NORVASC) 10 MG tablet Daily.      atorvastatin (LIPITOR) 40 MG tablet Take 1 tablet by mouth Daily. 90 tablet 3    Beclomethasone Dipropionate (QVAR IN) Inhale.      celecoxib (CeleBREX) 200 MG capsule Take 1 capsule by mouth Daily. 90 capsule 1    hydrALAZINE (APRESOLINE) 25 MG tablet Take 3 tablets by mouth 3 (Three) Times a Day. 3 tabs  tablet 3    nebivolol (BYSTOLIC) 20 MG tablet Daily.      nitroglycerin (NITROSTAT) 0.4 MG SL tablet Place 1 tablet under the tongue Every 5 (Five) Minutes As Needed for Chest Pain. Take no more than 3 doses in 15 minutes.      prasugrel (EFFIENT) 10 MG tablet Take 1 tablet by mouth Daily. 90 tablet 3    valsartan (DIOVAN) 320 MG tablet Daily.      [DISCONTINUED] Aspirin Adult Low Strength 81 MG EC tablet TAKE 1 TABLET BY MOUTH ONCE DAILY **TAKE WITH FOOD** 30 tablet 0    [DISCONTINUED] ProAir  (90 Base) MCG/ACT inhaler        Current Facility-Administered Medications on File Prior to Visit   Medication Dose Route Frequency Provider Last Rate Last Admin    cloNIDine (CATAPRES) tablet 0.1 mg  0.1 mg Oral Once Nilesh Collins APRN           ALLERGIES  Patient has no known allergies.    HISTORY  Past Medical History:   Diagnosis Date    Asthma     Heart murmur     Hypertension        Social History     Socioeconomic History    Marital status:    Tobacco Use    Smoking status: Every Day     Years: 20     Types: Cigarettes    Smokeless tobacco: Never   Substance and Sexual Activity    Drug use: Never    Sexual activity: Never       Family History   Problem Relation Age of Onset    Hypertension Mother     Hypertension Father        Review of Systems   Constitutional:  Negative for chills, fatigue and fever.   HENT:  Negative for congestion, rhinorrhea and  "sore throat.    Eyes:  Negative for visual disturbance.   Respiratory:  Negative for apnea, chest tightness and shortness of breath.    Cardiovascular:  Negative for chest pain, palpitations and leg swelling.   Gastrointestinal:  Negative for constipation, diarrhea and nausea.   Musculoskeletal:  Positive for back pain. Negative for arthralgias and neck pain.   Skin:  Negative for rash and wound.   Allergic/Immunologic: Positive for environmental allergies. Negative for food allergies.   Neurological:  Negative for dizziness, syncope, weakness and light-headedness.   Hematological:  Bruises/bleeds easily (bleeds easy).   Psychiatric/Behavioral:  Negative for sleep disturbance.        Objective     VITALS: /80 (BP Location: Left arm, Patient Position: Sitting, Cuff Size: Adult)   Pulse 56   Ht 175.3 cm (69.02\")   Wt 89.1 kg (196 lb 6.4 oz)   SpO2 98%   BMI 28.99 kg/m²     LABS:   Lab Results (most recent)       None            IMAGING:   No Images in the past 120 days found..    EXAM:  Physical Exam  Vitals and nursing note reviewed.   Constitutional:       Appearance: He is well-developed.   HENT:      Head: Normocephalic.   Neck:      Thyroid: No thyroid mass.      Vascular: No carotid bruit or JVD.      Trachea: Trachea and phonation normal.   Cardiovascular:      Rate and Rhythm: Normal rate and regular rhythm.      Pulses:           Radial pulses are 2+ on the right side and 2+ on the left side.        Posterior tibial pulses are 2+ on the right side and 2+ on the left side.      Heart sounds: Murmur heard.      No friction rub. No gallop.   Pulmonary:      Effort: Pulmonary effort is normal. No respiratory distress.      Breath sounds: Normal breath sounds. No wheezing or rales.   Musculoskeletal:         General: No swelling. Normal range of motion.      Cervical back: Neck supple.   Skin:     General: Skin is warm and dry.      Capillary Refill: Capillary refill takes less than 2 seconds.      " Findings: No rash.   Neurological:      Mental Status: He is alert and oriented to person, place, and time.   Psychiatric:         Speech: Speech normal.         Behavior: Behavior normal.         Thought Content: Thought content normal.         Judgment: Judgment normal.         Procedure   Procedures       Assessment & Plan    Diagnosis Plan   1. Primary hypertension        2. Coronary artery disease involving native coronary artery of native heart without angina pectoris        3. Heart murmur        1.  Patient's blood pressure is controlled on current blood pressure medication regimen spite elevated blood pressure today.  No medication changes are warranted at this time.  Patient advised to monitor blood pressure on a daily basis and report any persistent highs or lows.  Set goal blood pressure for patient at 130/80 or below.  He reports that his blood pressure usually runs 130-140/80.  2.  Patient does have stents in April of this year to the LAD and obtuse marginal 1.  He is on Effient and aspirin high intensity statin therapy of atorvastatin.  He denies any angina anginal equivalent symptoms.  Overall he feels like he is doing well from the cardiovascular standpoint.  We will continue to monitor at this time.  3.  Informed of signs and symptoms of ACS and advised to seek emergent treatment for any new worsening symptoms.  Patient also advised sooner follow-up as needed.  Also advised to follow-up with family doctor as needed  This note is dictated utilizing voice recognition software.  Although this record has been proof read, transcriptional errors may still be present. If questions occur regarding the content of this record please do not hesitate to call our office.  I have reviewed and confirmed the accuracy of the ROS as documented by the MA/JOELLE/RN NONI Horton    Return if symptoms worsen or fail to improve, for Next scheduled follow up.    Diagnoses and all orders for this visit:    1. Primary  hypertension (Primary)    2. Coronary artery disease involving native coronary artery of native heart without angina pectoris    3. Heart murmur    Other orders  -     aspirin (Aspirin Adult Low Strength) 81 MG EC tablet; Take 1 tablet by mouth Daily.  Dispense: 90 tablet; Refill: 1        Pankaj Nicholas  reports that he has been smoking cigarettes. He has never used smokeless tobacco.. I have educated him on the risk of diseases from using tobacco products .    MEDS ORDERED DURING VISIT:  New Medications Ordered This Visit   Medications    aspirin (Aspirin Adult Low Strength) 81 MG EC tablet     Sig: Take 1 tablet by mouth Daily.     Dispense:  90 tablet     Refill:  1           This document has been electronically signed by Nilesh Collins Jr., APRN  October 27, 2023 11:26 EDT

## 2023-10-30 ENCOUNTER — TELEPHONE (OUTPATIENT)
Dept: CARDIOLOGY | Facility: CLINIC | Age: 48
End: 2023-10-30
Payer: COMMERCIAL

## 2023-10-30 NOTE — TELEPHONE ENCOUNTER
----- Message from NONI Horton sent at 10/30/2023  9:49 AM EDT -----  LDL 50, HDL 28, triglycerides 92.  CBC normal, elevated glucose at 100 BUN 23 creatinine 1.37.  GFR of 64.  Vitamin D 14.2 which is low.  Normal B12 and TSH.      I called and left a voicemail for patient of above lab results as listed above.

## 2023-11-02 ENCOUNTER — TELEPHONE (OUTPATIENT)
Dept: CARDIOLOGY | Facility: CLINIC | Age: 48
End: 2023-11-02
Payer: COMMERCIAL

## 2023-11-02 NOTE — TELEPHONE ENCOUNTER
----- Message from NONI Horton sent at 10/30/2023  9:49 AM EDT -----  LDL 50, HDL 28, triglycerides 92.  CBC normal, elevated glucose at 100 BUN 23 creatinine 1.37.  GFR of 64.  Vitamin D 14.2 which is low.  Normal B12 and TSH.    I called patient and went over lab results as listed above . I will forward labs to pcp.

## 2023-12-21 RX ORDER — CELECOXIB 200 MG/1
CAPSULE ORAL
Qty: 90 CAPSULE | Refills: 0 | Status: SHIPPED | OUTPATIENT
Start: 2023-12-21

## 2024-04-29 ENCOUNTER — OFFICE VISIT (OUTPATIENT)
Dept: CARDIOLOGY | Facility: CLINIC | Age: 49
End: 2024-04-29
Payer: COMMERCIAL

## 2024-04-29 ENCOUNTER — LAB (OUTPATIENT)
Dept: CARDIOLOGY | Facility: CLINIC | Age: 49
End: 2024-04-29
Payer: COMMERCIAL

## 2024-04-29 VITALS
WEIGHT: 187.4 LBS | HEIGHT: 69 IN | OXYGEN SATURATION: 96 % | BODY MASS INDEX: 27.76 KG/M2 | DIASTOLIC BLOOD PRESSURE: 72 MMHG | HEART RATE: 67 BPM | SYSTOLIC BLOOD PRESSURE: 137 MMHG

## 2024-04-29 DIAGNOSIS — I25.708 CORONARY ARTERY DISEASE OF BYPASS GRAFT OF NATIVE HEART WITH STABLE ANGINA PECTORIS: ICD-10-CM

## 2024-04-29 DIAGNOSIS — R07.2 PRECORDIAL PAIN: Primary | ICD-10-CM

## 2024-04-29 DIAGNOSIS — R07.2 PRECORDIAL PAIN: ICD-10-CM

## 2024-04-29 DIAGNOSIS — R53.82 CHRONIC FATIGUE: ICD-10-CM

## 2024-04-29 DIAGNOSIS — R06.02 SHORTNESS OF BREATH: ICD-10-CM

## 2024-04-29 DIAGNOSIS — I10 PRIMARY HYPERTENSION: ICD-10-CM

## 2024-04-29 LAB
ALBUMIN SERPL-MCNC: 4.3 G/DL (ref 3.5–5.2)
ALBUMIN/GLOB SERPL: 1.8 G/DL
ALP SERPL-CCNC: 116 U/L (ref 39–117)
ALT SERPL W P-5'-P-CCNC: 17 U/L (ref 1–41)
ANION GAP SERPL CALCULATED.3IONS-SCNC: 10.4 MMOL/L (ref 5–15)
AST SERPL-CCNC: 14 U/L (ref 1–40)
BASOPHILS # BLD AUTO: 0.05 10*3/MM3 (ref 0–0.2)
BASOPHILS NFR BLD AUTO: 0.6 % (ref 0–1.5)
BILIRUB SERPL-MCNC: 0.4 MG/DL (ref 0–1.2)
BUN SERPL-MCNC: 13 MG/DL (ref 6–20)
BUN/CREAT SERPL: 13.1 (ref 7–25)
CALCIUM SPEC-SCNC: 8.9 MG/DL (ref 8.6–10.5)
CHLORIDE SERPL-SCNC: 104 MMOL/L (ref 98–107)
CHOLEST SERPL-MCNC: 107 MG/DL (ref 0–200)
CO2 SERPL-SCNC: 25.6 MMOL/L (ref 22–29)
CREAT SERPL-MCNC: 0.99 MG/DL (ref 0.76–1.27)
DEPRECATED RDW RBC AUTO: 45.1 FL (ref 37–54)
EGFRCR SERPLBLD CKD-EPI 2021: 94.6 ML/MIN/1.73
EOSINOPHIL # BLD AUTO: 0.16 10*3/MM3 (ref 0–0.4)
EOSINOPHIL NFR BLD AUTO: 1.9 % (ref 0.3–6.2)
ERYTHROCYTE [DISTWIDTH] IN BLOOD BY AUTOMATED COUNT: 14.1 % (ref 12.3–15.4)
GLOBULIN UR ELPH-MCNC: 2.4 GM/DL
GLUCOSE SERPL-MCNC: 85 MG/DL (ref 65–99)
HCT VFR BLD AUTO: 47 % (ref 37.5–51)
HDLC SERPL-MCNC: 26 MG/DL (ref 40–60)
HGB BLD-MCNC: 15.1 G/DL (ref 13–17.7)
IMM GRANULOCYTES # BLD AUTO: 0.08 10*3/MM3 (ref 0–0.05)
IMM GRANULOCYTES NFR BLD AUTO: 0.9 % (ref 0–0.5)
LDLC SERPL CALC-MCNC: 56 MG/DL (ref 0–100)
LDLC/HDLC SERPL: 2.01 {RATIO}
LYMPHOCYTES # BLD AUTO: 1.92 10*3/MM3 (ref 0.7–3.1)
LYMPHOCYTES NFR BLD AUTO: 22.4 % (ref 19.6–45.3)
MAGNESIUM SERPL-MCNC: 2.2 MG/DL (ref 1.6–2.6)
MCH RBC QN AUTO: 27.9 PG (ref 26.6–33)
MCHC RBC AUTO-ENTMCNC: 32.1 G/DL (ref 31.5–35.7)
MCV RBC AUTO: 86.7 FL (ref 79–97)
MONOCYTES # BLD AUTO: 0.63 10*3/MM3 (ref 0.1–0.9)
MONOCYTES NFR BLD AUTO: 7.3 % (ref 5–12)
NEUTROPHILS NFR BLD AUTO: 5.74 10*3/MM3 (ref 1.7–7)
NEUTROPHILS NFR BLD AUTO: 66.9 % (ref 42.7–76)
NRBC BLD AUTO-RTO: 0 /100 WBC (ref 0–0.2)
PLATELET # BLD AUTO: 221 10*3/MM3 (ref 140–450)
PMV BLD AUTO: 12 FL (ref 6–12)
POTASSIUM SERPL-SCNC: 4.2 MMOL/L (ref 3.5–5.2)
PROT SERPL-MCNC: 6.7 G/DL (ref 6–8.5)
RBC # BLD AUTO: 5.42 10*6/MM3 (ref 4.14–5.8)
SODIUM SERPL-SCNC: 140 MMOL/L (ref 136–145)
TRIGL SERPL-MCNC: 144 MG/DL (ref 0–150)
TSH SERPL DL<=0.05 MIU/L-ACNC: 0.64 UIU/ML (ref 0.27–4.2)
VLDLC SERPL-MCNC: 25 MG/DL (ref 5–40)
WBC NRBC COR # BLD AUTO: 8.58 10*3/MM3 (ref 3.4–10.8)

## 2024-04-29 PROCEDURE — 82607 VITAMIN B-12: CPT | Performed by: NURSE PRACTITIONER

## 2024-04-29 PROCEDURE — 80061 LIPID PANEL: CPT | Performed by: NURSE PRACTITIONER

## 2024-04-29 PROCEDURE — 83735 ASSAY OF MAGNESIUM: CPT | Performed by: NURSE PRACTITIONER

## 2024-04-29 PROCEDURE — 84403 ASSAY OF TOTAL TESTOSTERONE: CPT | Performed by: NURSE PRACTITIONER

## 2024-04-29 PROCEDURE — 1159F MED LIST DOCD IN RCRD: CPT | Performed by: NURSE PRACTITIONER

## 2024-04-29 PROCEDURE — 82746 ASSAY OF FOLIC ACID SERUM: CPT | Performed by: NURSE PRACTITIONER

## 2024-04-29 PROCEDURE — 93000 ELECTROCARDIOGRAM COMPLETE: CPT | Performed by: NURSE PRACTITIONER

## 2024-04-29 PROCEDURE — 80050 GENERAL HEALTH PANEL: CPT | Performed by: NURSE PRACTITIONER

## 2024-04-29 PROCEDURE — 1160F RVW MEDS BY RX/DR IN RCRD: CPT | Performed by: NURSE PRACTITIONER

## 2024-04-29 PROCEDURE — 82652 VIT D 1 25-DIHYDROXY: CPT | Performed by: NURSE PRACTITIONER

## 2024-04-29 PROCEDURE — 36415 COLL VENOUS BLD VENIPUNCTURE: CPT

## 2024-04-29 PROCEDURE — 99214 OFFICE O/P EST MOD 30 MIN: CPT | Performed by: NURSE PRACTITIONER

## 2024-04-29 RX ORDER — ASPIRIN 81 MG/1
81 TABLET ORAL DAILY
Qty: 90 TABLET | Refills: 3 | Status: SHIPPED | OUTPATIENT
Start: 2024-04-29

## 2024-04-29 RX ORDER — PRASUGREL 10 MG/1
10 TABLET, FILM COATED ORAL DAILY
Qty: 90 TABLET | Refills: 3 | Status: SHIPPED | OUTPATIENT
Start: 2024-04-29

## 2024-04-29 RX ORDER — ATORVASTATIN CALCIUM 40 MG/1
40 TABLET, FILM COATED ORAL DAILY
Qty: 90 TABLET | Refills: 3 | Status: SHIPPED | OUTPATIENT
Start: 2024-04-29

## 2024-04-29 RX ORDER — HYDRALAZINE HYDROCHLORIDE 25 MG/1
75 TABLET, FILM COATED ORAL 3 TIMES DAILY
Qty: 810 TABLET | Refills: 3 | Status: SHIPPED | OUTPATIENT
Start: 2024-04-29

## 2024-04-29 RX ORDER — VALSARTAN 320 MG/1
320 TABLET ORAL DAILY
Qty: 90 TABLET | Refills: 3 | Status: SHIPPED | OUTPATIENT
Start: 2024-04-29

## 2024-04-29 RX ORDER — NEBIVOLOL 20 MG/1
20 TABLET ORAL DAILY
Qty: 90 TABLET | Refills: 3 | Status: SHIPPED | OUTPATIENT
Start: 2024-04-29

## 2024-04-29 RX ORDER — AMLODIPINE BESYLATE 10 MG/1
10 TABLET ORAL DAILY
Qty: 90 TABLET | Refills: 3 | Status: SHIPPED | OUTPATIENT
Start: 2024-04-29

## 2024-04-29 NOTE — PROGRESS NOTES
Subjective     Pankaj Nicholas is a 49 y.o. male who presents to day for 6 month follow up  and Hypertension.    CHIEF COMPLIANT  Chief Complaint   Patient presents with    6 month follow up     Hypertension       Active Problems:  Problem List Items Addressed This Visit    None  Visit Diagnoses       Precordial pain    -  Primary    Relevant Orders    ECG 12 Lead    Comprehensive Metabolic Panel    CBC & Differential    TSH    Lipid Panel    Magnesium    Vitamin B12    Folate    Testosterone    Vitamin D 1,25 Dihydroxy    Shortness of breath        Relevant Orders    ECG 12 Lead    Comprehensive Metabolic Panel    CBC & Differential    TSH    Lipid Panel    Magnesium    Vitamin B12    Folate    Testosterone    Vitamin D 1,25 Dihydroxy    Coronary artery disease of bypass graft of native heart with stable angina pectoris        Relevant Medications    prasugrel (EFFIENT) 10 MG tablet    nebivolol (BYSTOLIC) 20 MG tablet    amLODIPine (NORVASC) 10 MG tablet    Other Relevant Orders    ECG 12 Lead    Comprehensive Metabolic Panel    CBC & Differential    TSH    Lipid Panel    Magnesium    Vitamin B12    Folate    Testosterone    Vitamin D 1,25 Dihydroxy    Chronic fatigue        Relevant Orders    ECG 12 Lead    Comprehensive Metabolic Panel    CBC & Differential    TSH    Lipid Panel    Magnesium    Vitamin B12    Folate    Testosterone    Vitamin D 1,25 Dihydroxy    Primary hypertension        Relevant Medications    valsartan (DIOVAN) 320 MG tablet    nebivolol (BYSTOLIC) 20 MG tablet    hydrALAZINE (APRESOLINE) 25 MG tablet    amLODIPine (NORVASC) 10 MG tablet    Other Relevant Orders    ECG 12 Lead              Problem list  1.  Hypertension  Echocardiogram 3/22: EF 65 to 70%, grade 2 diastolic dysfunction, moderate to severe dominantly concentric left ventricular hypertrophy with small septal ridge immediately below the LV outflow tract.  Mild amount of Blaine mild MR physiological TR  1.2 cardiac MRI: Apical  variant hypertrophic cardiomyopathy, EF 63%  2.  Heart murmur  3.  Shortness of breath  4.  Left heart catheterization 4/2023 left main patent, LAD proximal 95%, OM1 70%, circumflex 30 to 40%, RCA mid mild disease, LVEDP 36, stent placement to the LAD and OM1    HPI  KALE Nicholas is a 49-year-old male patient who is being followed up today for chronic arterial hypertension and coronary artery disease.    Patient does have a history of chronic arterial hypertension in which she is being treated with amlodipine hydralazine nebivolol and valsartan.  Today's blood pressure is controlled at 137/72 heart rate is 67.    Patient does have coronary artery disease in which she did undergo left heart catheterization in April 2023 that showed a patent left main, LAD proximal 95%, %, circumflex 30 to 40%, RCA mid mild disease, LVEDP was 36.  Patient received stents to the LAD and OM1 at that time.  He is on Effient and aspirin for dual antiplatelet therapy atorvastatin for high intensity statin therapy.    Patient does report some chest pain that occurs on the right side of his chest into his right shoulder.  He says it originates from the right shoulder and is going on for 1 to 2 weeks.  He says it is a dull like pain depends on what he is done whether or not it occurs.  It is tender to palpation.  He denies any associated symptoms.  He says this is nothing like what led to his previous stenting.  He says he believes it is more musculoskeletal in nature.    Patient does report shortness of breath that occurs at random.  It can occur both with rest or exertion.  He says just every now and then it may feel like he to skips of breath.  He says he feels fine before during and after really does not get impacted by it.  He has a good functional capacity and able to perform any activities that he wishes to do.    He does report that he does get fatigued he says that he is tired all the time he says he sleeps good and he is  also been evaluated for sleep apnea which was negative.  PRIOR MEDS  Current Outpatient Medications on File Prior to Visit   Medication Sig Dispense Refill    Beclomethasone Dipropionate (QVAR IN) Inhale.      celecoxib (CeleBREX) 200 MG capsule TAKE 1 CAPSULE BY MOUTH ONCE DAILY **TAKE WITH FOOD** 90 capsule 0    nitroglycerin (NITROSTAT) 0.4 MG SL tablet Place 1 tablet under the tongue Every 5 (Five) Minutes As Needed for Chest Pain. Take no more than 3 doses in 15 minutes.      [DISCONTINUED] amLODIPine (NORVASC) 10 MG tablet Daily.      [DISCONTINUED] aspirin (Aspirin Adult Low Strength) 81 MG EC tablet Take 1 tablet by mouth Daily. 90 tablet 1    [DISCONTINUED] atorvastatin (LIPITOR) 40 MG tablet Take 1 tablet by mouth Daily. 90 tablet 3    [DISCONTINUED] hydrALAZINE (APRESOLINE) 25 MG tablet Take 3 tablets by mouth 3 (Three) Times a Day. 3 tabs  tablet 3    [DISCONTINUED] nebivolol (BYSTOLIC) 20 MG tablet Daily.      [DISCONTINUED] prasugrel (EFFIENT) 10 MG tablet Take 1 tablet by mouth Daily. 90 tablet 3    [DISCONTINUED] valsartan (DIOVAN) 320 MG tablet Daily.       Current Facility-Administered Medications on File Prior to Visit   Medication Dose Route Frequency Provider Last Rate Last Admin    cloNIDine (CATAPRES) tablet 0.1 mg  0.1 mg Oral Once Nilesh Collins APRN           ALLERGIES  Patient has no known allergies.    HISTORY  Past Medical History:   Diagnosis Date    Asthma     Heart murmur     Hypertension        Social History     Socioeconomic History    Marital status:    Tobacco Use    Smoking status: Every Day     Types: Cigarettes    Smokeless tobacco: Never   Substance and Sexual Activity    Drug use: Never    Sexual activity: Never       Family History   Problem Relation Age of Onset    Hypertension Mother     Hypertension Father        Review of Systems   Constitutional:  Negative for chills, fatigue and fever.   HENT:  Negative for congestion, rhinorrhea and sore throat.   "  Eyes:  Negative for visual disturbance.   Respiratory:  Positive for shortness of breath (ocassional  feels like he skips a breath). Negative for apnea and chest tightness.    Cardiovascular:  Positive for chest pain (some on the right side is new back of right shoulder is also hurting). Negative for leg swelling.   Gastrointestinal:  Negative for constipation, diarrhea and nausea.   Musculoskeletal:  Positive for back pain. Negative for arthralgias and neck pain.   Allergic/Immunologic: Positive for environmental allergies. Negative for food allergies.   Neurological:  Negative for dizziness, syncope, weakness and light-headedness.   Hematological:  Bruises/bleeds easily.   Psychiatric/Behavioral:  Negative for sleep disturbance.        Objective     VITALS: /72 (BP Location: Left arm, Patient Position: Sitting, Cuff Size: Adult)   Pulse 67   Ht 175.3 cm (69.02\")   Wt 85 kg (187 lb 6.4 oz)   SpO2 96%   BMI 27.66 kg/m²     LABS:   Lab Results (most recent)       None            IMAGING:   No Images in the past 120 days found..    EXAM:  Physical Exam  Vitals and nursing note reviewed.   Constitutional:       Appearance: He is well-developed.   HENT:      Head: Normocephalic.   Neck:      Thyroid: No thyroid mass.      Vascular: No carotid bruit or JVD.      Trachea: Trachea and phonation normal.   Cardiovascular:      Rate and Rhythm: Normal rate and regular rhythm.      Pulses:           Radial pulses are 2+ on the right side and 2+ on the left side.      Heart sounds: Normal heart sounds. Murmur heard.      No friction rub. No gallop.   Pulmonary:      Effort: Pulmonary effort is normal. No respiratory distress.      Breath sounds: Normal breath sounds. No wheezing or rales.   Musculoskeletal:         General: No swelling. Normal range of motion.      Cervical back: Neck supple.   Skin:     General: Skin is warm and dry.      Capillary Refill: Capillary refill takes less than 2 seconds.      Findings: " No rash.   Neurological:      Mental Status: He is alert and oriented to person, place, and time.   Psychiatric:         Speech: Speech normal.         Behavior: Behavior normal.         Thought Content: Thought content normal.         Judgment: Judgment normal.         Procedure     ECG 12 Lead    Date/Time: 4/29/2024 2:25 PM  Performed by: Nilesh Collins APRN    Authorized by: Nilesh Collins APRN  Comparison: compared with previous ECG from 4/18/2023  Similar to previous ECG  Rhythm: sinus bradycardia  Rate: bradycardic  BPM: 54  QRS axis: normal  Other findings: non-specific ST-T wave changes and left ventricular hypertrophy with strain  Comments: QTc 460 ms             Assessment & Plan    Diagnosis Plan   1. Precordial pain  ECG 12 Lead    Comprehensive Metabolic Panel    CBC & Differential    TSH    Lipid Panel    Magnesium    Vitamin B12    Folate    Testosterone    Vitamin D 1,25 Dihydroxy      2. Shortness of breath  ECG 12 Lead    Comprehensive Metabolic Panel    CBC & Differential    TSH    Lipid Panel    Magnesium    Vitamin B12    Folate    Testosterone    Vitamin D 1,25 Dihydroxy      3. Coronary artery disease of bypass graft of native heart with stable angina pectoris  ECG 12 Lead    Comprehensive Metabolic Panel    CBC & Differential    TSH    Lipid Panel    Magnesium    Vitamin B12    Folate    Testosterone    Vitamin D 1,25 Dihydroxy      4. Chronic fatigue  ECG 12 Lead    Comprehensive Metabolic Panel    CBC & Differential    TSH    Lipid Panel    Magnesium    Vitamin B12    Folate    Testosterone    Vitamin D 1,25 Dihydroxy      5. Primary hypertension  ECG 12 Lead    hydrALAZINE (APRESOLINE) 25 MG tablet      1.  Patient does have chest pain that is atypical of angina and seems to be more musculoskeletal in nature.  We did discuss repeat stress test.  However patient feels like it is more musculoskeletal in nature.  We will continue to follow at this time.  2.  Patient's blood pressure is  controlled on current blood pressure medication regimen.  No medication changes are warranted at this time.  Patient advised to monitor blood pressure on a daily basis and report any persistent highs or lows.  Set goal blood pressure for patient at 130/80 or below.  3.  Due to patient's fatigue, hyperlipidemia, coronary artery disease I would like to get an extensive laboratory workup including CBC, TSH, lipid panel, magnesium, vitamin B12, folate, vitamin D, and testosterone.  4.  Informed of signs and symptoms of ACS and advised to seek emergent treatment for any new worsening symptoms.  Patient also advised sooner follow-up as needed.  Also advised to follow-up with family doctor as needed  This note is dictated utilizing voice recognition software.  Although this record has been proof read, transcriptional errors may still be present. If questions occur regarding the content of this record please do not hesitate to call our office.  I have reviewed and confirmed the accuracy of the ROS as documented by the MA/LPN/RN NONI Horton    Return if symptoms worsen or fail to improve, for Next scheduled follow up.    Diagnoses and all orders for this visit:    1. Precordial pain (Primary)  -     ECG 12 Lead  -     Comprehensive Metabolic Panel; Future  -     CBC & Differential; Future  -     TSH; Future  -     Lipid Panel; Future  -     Magnesium; Future  -     Vitamin B12; Future  -     Folate; Future  -     Testosterone; Future  -     Vitamin D 1,25 Dihydroxy; Future    2. Shortness of breath  -     ECG 12 Lead  -     Comprehensive Metabolic Panel; Future  -     CBC & Differential; Future  -     TSH; Future  -     Lipid Panel; Future  -     Magnesium; Future  -     Vitamin B12; Future  -     Folate; Future  -     Testosterone; Future  -     Vitamin D 1,25 Dihydroxy; Future    3. 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 Chronic fatigue  -     ECG 12 Lead  -     Comprehensive Metabolic Panel; Future  -     CBC & Differential; Future  -     TSH; Future  -     Lipid Panel; Future  -     Magnesium; Future  -     Vitamin B12; Future  -     Folate; Future  -     Testosterone; Future  -     Vitamin D 1,25 Dihydroxy; Future    5. Primary hypertension  -     ECG 12 Lead  -     hydrALAZINE (APRESOLINE) 25 MG tablet; Take 3 tablets by mouth 3 (Three) Times a Day. 3 tabs TID  Dispense: 810 tablet; Refill: 3    Other orders  -     valsartan (DIOVAN) 320 MG tablet; Take 1 tablet by mouth Daily.  Dispense: 90 tablet; Refill: 3  -     prasugrel (EFFIENT) 10 MG tablet; Take 1 tablet by mouth Daily.  Dispense: 90 tablet; Refill: 3  -     nebivolol (BYSTOLIC) 20 MG tablet; Take 1 tablet by mouth Daily.  Dispense: 90 tablet; Refill: 3  -     atorvastatin (LIPITOR) 40 MG tablet; Take 1 tablet by mouth Daily.  Dispense: 90 tablet; Refill: 3  -     amLODIPine (NORVASC) 10 MG tablet; Take 1 tablet by mouth Daily.  Dispense: 90 tablet; Refill: 3  -     aspirin (Aspirin Adult Low Strength) 81 MG EC tablet; Take 1 tablet by mouth Daily.  Dispense: 90 tablet; Refill: 3        Pankaj Nicholas  reports that he has been smoking cigarettes. He has never used smokeless tobacco. I have educated him on the risk of diseases from using tobacco products such as cancer, COPD, and heart disease.     I advised him to quit and he is willing to quit. He has cut back on his smoking by half.  I spent 5 minutes counseling the patient.           BMI is >= 25 and <30. (Overweight) The following options were offered after discussion;: exercise counseling/recommendations           MEDS ORDERED DURING VISIT:  New Medications Ordered This Visit    Medications    valsartan (DIOVAN) 320 MG tablet     Sig: Take 1 tablet by mouth Daily.     Dispense:  90 tablet     Refill:  3    prasugrel (EFFIENT) 10 MG tablet     Sig: Take 1 tablet by mouth Daily.     Dispense:  90 tablet     Refill:  3    nebivolol (BYSTOLIC) 20 MG tablet     Sig: Take 1 tablet by mouth Daily.     Dispense:  90 tablet     Refill:  3    hydrALAZINE (APRESOLINE) 25 MG tablet     Sig: Take 3 tablets by mouth 3 (Three) Times a Day. 3 tabs TID     Dispense:  810 tablet     Refill:  3    atorvastatin (LIPITOR) 40 MG tablet     Sig: Take 1 tablet by mouth Daily.     Dispense:  90 tablet     Refill:  3    amLODIPine (NORVASC) 10 MG tablet     Sig: Take 1 tablet by mouth Daily.     Dispense:  90 tablet     Refill:  3    aspirin (Aspirin Adult Low Strength) 81 MG EC tablet     Sig: Take 1 tablet by mouth Daily.     Dispense:  90 tablet     Refill:  3           This document has been electronically signed by Nilesh Collins Jr., NONI  April 29, 2024 15:02 EDT

## 2024-04-30 LAB
FOLATE SERPL-MCNC: 9.6 NG/ML (ref 4.78–24.2)
TESTOST SERPL-MCNC: 449 NG/DL (ref 249–836)
VIT B12 BLD-MCNC: 293 PG/ML (ref 211–946)

## 2024-04-30 NOTE — PROGRESS NOTES
Lipid panel identified triglycerides 144, HDL 26, LDL 56.    CBC normal, testosterone normal, B12 normal, folate normal, TSH normal, normal CMP and magnesium as well.    Keep follow-up.  No discrete explanation of patient's fatigue

## 2024-05-02 ENCOUNTER — TELEPHONE (OUTPATIENT)
Dept: CARDIOLOGY | Facility: CLINIC | Age: 49
End: 2024-05-02
Payer: COMMERCIAL

## 2024-05-02 LAB — 1,25(OH)2D SERPL-MCNC: 51.9 PG/ML (ref 24.8–81.5)

## 2024-05-02 NOTE — TELEPHONE ENCOUNTER
----- Message from Mary PHELPS sent at 4/30/2024  8:34 AM EDT -----  Lipid panel identified triglycerides 144, HDL 26, LDL 56.    CBC normal, testosterone normal, B12 normal, folate normal, TSH normal, normal CMP and magnesium as well.    Keep follow-up.  No discrete explanation of patient's fatigue

## 2024-05-02 NOTE — TELEPHONE ENCOUNTER
First attempt to reach pt. Left a voicemail for pt to return my call at [phone].       RELAY      Overall normal results, no explanation for pt's fatigue.

## 2024-05-03 ENCOUNTER — TELEPHONE (OUTPATIENT)
Dept: CARDIOLOGY | Facility: CLINIC | Age: 49
End: 2024-05-03
Payer: COMMERCIAL

## 2024-05-03 NOTE — TELEPHONE ENCOUNTER
----- Message from Tesha SERRANO sent at 5/2/2024  5:19 PM EDT -----    ----- Message -----  From: Nilesh Collins APRN  Sent: 5/2/2024   1:20 PM EDT  To: Genny Deng MA    Vitamin D is normal

## 2024-05-03 NOTE — TELEPHONE ENCOUNTER
"Relay     \"Calling to inform you that your vitamin D lab came back normal. Thank you \"                  "

## 2024-10-29 ENCOUNTER — OFFICE VISIT (OUTPATIENT)
Dept: CARDIOLOGY | Facility: CLINIC | Age: 49
End: 2024-10-29
Payer: COMMERCIAL

## 2024-10-29 ENCOUNTER — LAB (OUTPATIENT)
Dept: CARDIOLOGY | Facility: CLINIC | Age: 49
End: 2024-10-29
Payer: COMMERCIAL

## 2024-10-29 VITALS
WEIGHT: 185.2 LBS | HEIGHT: 69 IN | OXYGEN SATURATION: 99 % | BODY MASS INDEX: 27.43 KG/M2 | DIASTOLIC BLOOD PRESSURE: 75 MMHG | SYSTOLIC BLOOD PRESSURE: 141 MMHG | HEART RATE: 65 BPM

## 2024-10-29 DIAGNOSIS — R07.2 PRECORDIAL PAIN: ICD-10-CM

## 2024-10-29 DIAGNOSIS — I10 PRIMARY HYPERTENSION: ICD-10-CM

## 2024-10-29 DIAGNOSIS — R53.82 CHRONIC FATIGUE: ICD-10-CM

## 2024-10-29 DIAGNOSIS — I25.708 CORONARY ARTERY DISEASE OF BYPASS GRAFT OF NATIVE HEART WITH STABLE ANGINA PECTORIS: ICD-10-CM

## 2024-10-29 DIAGNOSIS — R07.2 PRECORDIAL PAIN: Primary | ICD-10-CM

## 2024-10-29 LAB
ALBUMIN SERPL-MCNC: 4.1 G/DL (ref 3.5–5.2)
ALBUMIN/GLOB SERPL: 1.5 G/DL
ALP SERPL-CCNC: 101 U/L (ref 39–117)
ALT SERPL W P-5'-P-CCNC: 16 U/L (ref 1–41)
ANION GAP SERPL CALCULATED.3IONS-SCNC: 11.6 MMOL/L (ref 5–15)
AST SERPL-CCNC: 12 U/L (ref 1–40)
BILIRUB SERPL-MCNC: 0.3 MG/DL (ref 0–1.2)
BUN SERPL-MCNC: 14 MG/DL (ref 6–20)
BUN/CREAT SERPL: 10.1 (ref 7–25)
CALCIUM SPEC-SCNC: 9.2 MG/DL (ref 8.6–10.5)
CHLORIDE SERPL-SCNC: 104 MMOL/L (ref 98–107)
CHOLEST SERPL-MCNC: 91 MG/DL (ref 0–200)
CO2 SERPL-SCNC: 24.4 MMOL/L (ref 22–29)
CREAT SERPL-MCNC: 1.39 MG/DL (ref 0.76–1.27)
EGFRCR SERPLBLD CKD-EPI 2021: 62.9 ML/MIN/1.73
GLOBULIN UR ELPH-MCNC: 2.7 GM/DL
GLUCOSE SERPL-MCNC: 129 MG/DL (ref 65–99)
HDLC SERPL-MCNC: 27 MG/DL (ref 40–60)
LDLC SERPL CALC-MCNC: 46 MG/DL (ref 0–100)
LDLC/HDLC SERPL: 1.67 {RATIO}
MAGNESIUM SERPL-MCNC: 2.1 MG/DL (ref 1.6–2.6)
POTASSIUM SERPL-SCNC: 3.6 MMOL/L (ref 3.5–5.2)
PROT SERPL-MCNC: 6.8 G/DL (ref 6–8.5)
SODIUM SERPL-SCNC: 140 MMOL/L (ref 136–145)
TRIGL SERPL-MCNC: 94 MG/DL (ref 0–150)
VLDLC SERPL-MCNC: 18 MG/DL (ref 5–40)

## 2024-10-29 PROCEDURE — 99214 OFFICE O/P EST MOD 30 MIN: CPT | Performed by: NURSE PRACTITIONER

## 2024-10-29 PROCEDURE — 80053 COMPREHEN METABOLIC PANEL: CPT | Performed by: NURSE PRACTITIONER

## 2024-10-29 PROCEDURE — 36415 COLL VENOUS BLD VENIPUNCTURE: CPT

## 2024-10-29 PROCEDURE — 83735 ASSAY OF MAGNESIUM: CPT | Performed by: NURSE PRACTITIONER

## 2024-10-29 PROCEDURE — 80061 LIPID PANEL: CPT | Performed by: NURSE PRACTITIONER

## 2024-10-29 PROCEDURE — 1160F RVW MEDS BY RX/DR IN RCRD: CPT | Performed by: NURSE PRACTITIONER

## 2024-10-29 PROCEDURE — 1159F MED LIST DOCD IN RCRD: CPT | Performed by: NURSE PRACTITIONER

## 2024-10-29 RX ORDER — ASPIRIN 81 MG/1
81 TABLET ORAL DAILY
Qty: 90 TABLET | Refills: 3 | Status: SHIPPED | OUTPATIENT
Start: 2024-10-29

## 2024-10-29 RX ORDER — ATORVASTATIN CALCIUM 40 MG/1
40 TABLET, FILM COATED ORAL DAILY
Qty: 90 TABLET | Refills: 3 | Status: SHIPPED | OUTPATIENT
Start: 2024-10-29

## 2024-10-29 RX ORDER — VALSARTAN 320 MG/1
320 TABLET ORAL DAILY
Qty: 90 TABLET | Refills: 3 | Status: SHIPPED | OUTPATIENT
Start: 2024-10-29

## 2024-10-29 RX ORDER — ISOSORBIDE MONONITRATE 30 MG/1
30 TABLET, EXTENDED RELEASE ORAL DAILY
Qty: 30 TABLET | Refills: 6 | Status: SHIPPED | OUTPATIENT
Start: 2024-10-29

## 2024-10-29 RX ORDER — NITROGLYCERIN 0.4 MG/1
0.4 TABLET SUBLINGUAL
Qty: 30 TABLET | Refills: 3 | Status: SHIPPED | OUTPATIENT
Start: 2024-10-29

## 2024-10-29 RX ORDER — NEBIVOLOL 20 MG/1
20 TABLET ORAL DAILY
Qty: 90 TABLET | Refills: 3 | Status: SHIPPED | OUTPATIENT
Start: 2024-10-29

## 2024-10-29 RX ORDER — PRASUGREL 10 MG/1
10 TABLET, FILM COATED ORAL DAILY
Qty: 90 TABLET | Refills: 3 | Status: SHIPPED | OUTPATIENT
Start: 2024-10-29

## 2024-10-29 RX ORDER — HYDRALAZINE HYDROCHLORIDE 25 MG/1
75 TABLET, FILM COATED ORAL 3 TIMES DAILY
Qty: 270 TABLET | Refills: 11 | Status: SHIPPED | OUTPATIENT
Start: 2024-10-29

## 2024-10-29 RX ORDER — AMLODIPINE BESYLATE 10 MG/1
10 TABLET ORAL DAILY
Qty: 90 TABLET | Refills: 3 | Status: SHIPPED | OUTPATIENT
Start: 2024-10-29

## 2024-10-29 RX ORDER — CETIRIZINE HYDROCHLORIDE 10 MG/1
10 TABLET ORAL DAILY
COMMUNITY

## 2024-10-29 NOTE — PROGRESS NOTES
Subjective     Pankaj Nicholas is a 49 y.o. male who presents to day for Hypertension (6 month follow up) and Chest Pain.    CHIEF COMPLIANT  Chief Complaint   Patient presents with    Hypertension     6 month follow up    Chest Pain       Active Problems:  Problem List Items Addressed This Visit    None  Visit Diagnoses       Precordial pain    -  Primary    Relevant Medications    isosorbide mononitrate (IMDUR) 30 MG 24 hr tablet    Other Relevant Orders    Stress Test With Myocardial Perfusion One Day    Comprehensive Metabolic Panel    Lipid Panel    Magnesium    Primary hypertension        Relevant Medications    amLODIPine (NORVASC) 10 MG tablet    hydrALAZINE (APRESOLINE) 25 MG tablet    nebivolol (BYSTOLIC) 20 MG tablet    valsartan (DIOVAN) 320 MG tablet    Other Relevant Orders    Stress Test With Myocardial Perfusion One Day    Comprehensive Metabolic Panel    Lipid Panel    Magnesium    Coronary artery disease of bypass graft of native heart with stable angina pectoris        Relevant Medications    amLODIPine (NORVASC) 10 MG tablet    nebivolol (BYSTOLIC) 20 MG tablet    nitroglycerin (NITROSTAT) 0.4 MG SL tablet    prasugrel (EFFIENT) 10 MG tablet    isosorbide mononitrate (IMDUR) 30 MG 24 hr tablet    Other Relevant Orders    Stress Test With Myocardial Perfusion One Day    Comprehensive Metabolic Panel    Lipid Panel    Magnesium    Chronic fatigue        Relevant Orders    Stress Test With Myocardial Perfusion One Day    Comprehensive Metabolic Panel    Lipid Panel    Magnesium          Problem list  1.  Hypertension  Echocardiogram 3/22: EF 65 to 70%, grade 2 diastolic dysfunction, moderate to severe dominantly concentric left ventricular hypertrophy with small septal ridge immediately below the LV outflow tract.  Mild amount of [place] mild MR physiological TR  1.2 cardiac MRI: Apical variant hypertrophic cardiomyopathy, EF 63%  2.  Heart murmur  3.  Shortness of breath  4.  Left heart  catheterization 4/2023 left main patent, LAD proximal 95%, OM1 70%, circumflex 30 to 40%, RCA mid mild disease, LVEDP 36, stent placement to the LAD and OM1    HPI  HPI  Pankaj Nicholas is a 49-year-old male patient who is being followed up today for chronic arterial hypertension and coronary artery disease.     Patient does have a history of chronic arterial hypertension in which she is being treated with amlodipine hydralazine nebivolol and valsartan.  Today's blood pressure is 141/75 heart rate of 65.  He says his blood pressure typically runs about 125 systolic at home.    Patient does have coronary artery disease in which she did undergo left heart catheterization in April 2023 that showed a patent left main, LAD proximal 95%, %, circumflex 30 to 40%, RCA mid mild disease, LVEDP was 36.  Patient received stents to the LAD and OM1 at that time.  He is on Effient and aspirin for dual antiplatelet therapy atorvastatin for high intensity statin therapy.    Patient does report an episode of chest pain that occurred about 3 weeks ago.  He was sitting on the couch watching TV.  He developed a sharp chest pain in his left chest that lasted a couple minutes.  He did take a nitroglycerin which did seem to relieve the pain.  He denies any associated symptoms.    Patient does report fatigue where he gets tired easily.      PRIOR MEDS  Current Outpatient Medications on File Prior to Visit   Medication Sig Dispense Refill    Beclomethasone Dipropionate (QVAR IN) Inhale.      cetirizine (zyrTEC) 10 MG tablet Take 1 tablet by mouth Daily.      [DISCONTINUED] amLODIPine (NORVASC) 10 MG tablet Take 1 tablet by mouth Daily. 90 tablet 3    [DISCONTINUED] aspirin (Aspirin Adult Low Strength) 81 MG EC tablet Take 1 tablet by mouth Daily. 90 tablet 3    [DISCONTINUED] atorvastatin (LIPITOR) 40 MG tablet Take 1 tablet by mouth Daily. 90 tablet 3    [DISCONTINUED] hydrALAZINE (APRESOLINE) 25 MG tablet Take 3 tablets by mouth 3  (Three) Times a Day. 3 tabs  tablet 3    [DISCONTINUED] nebivolol (BYSTOLIC) 20 MG tablet Take 1 tablet by mouth Daily. 90 tablet 3    [DISCONTINUED] nitroglycerin (NITROSTAT) 0.4 MG SL tablet Place 1 tablet under the tongue Every 5 (Five) Minutes As Needed for Chest Pain. Take no more than 3 doses in 15 minutes.      [DISCONTINUED] prasugrel (EFFIENT) 10 MG tablet Take 1 tablet by mouth Daily. 90 tablet 3    [DISCONTINUED] valsartan (DIOVAN) 320 MG tablet Take 1 tablet by mouth Daily. 90 tablet 3    [DISCONTINUED] celecoxib (CeleBREX) 200 MG capsule TAKE 1 CAPSULE BY MOUTH ONCE DAILY **TAKE WITH FOOD** 90 capsule 0     Current Facility-Administered Medications on File Prior to Visit   Medication Dose Route Frequency Provider Last Rate Last Admin    [DISCONTINUED] cloNIDine (CATAPRES) tablet 0.1 mg  0.1 mg Oral Once Nilesh Collins APRN           ALLERGIES  Patient has no known allergies.    HISTORY  Past Medical History:   Diagnosis Date    Asthma     Heart murmur     Hypertension        Social History     Socioeconomic History    Marital status:    Tobacco Use    Smoking status: Every Day     Types: Cigarettes    Smokeless tobacco: Never   Substance and Sexual Activity    Drug use: Never    Sexual activity: Never       Family History   Problem Relation Age of Onset    Hypertension Mother     Hypertension Father        Review of Systems   Constitutional:  Positive for fatigue. Negative for chills, diaphoresis and fever.   HENT: Negative.     Eyes: Negative.    Respiratory:  Negative for apnea, cough, chest tightness, shortness of breath and wheezing.    Cardiovascular:  Positive for chest pain (one episode 3 weeks ago while sitting, relieved by Nitro). Negative for palpitations and leg swelling.   Gastrointestinal: Negative.  Negative for abdominal pain, blood in stool, constipation, diarrhea, nausea and vomiting.   Endocrine: Negative.    Genitourinary: Negative.  Negative for hematuria.  "  Musculoskeletal: Negative.  Positive for back pain. Negative for arthralgias, myalgias and neck pain.   Skin: Negative.    Allergic/Immunologic: Positive for environmental allergies (cats).   Neurological: Negative.  Negative for dizziness, syncope, weakness, light-headedness, numbness and headaches.   Hematological: Negative.  Bruises/bleeds easily.   Psychiatric/Behavioral: Negative.  Negative for sleep disturbance.        Objective     VITALS: /75 (BP Location: Left arm, Patient Position: Sitting)   Pulse 65   Ht 175.3 cm (69.02\")   Wt 84 kg (185 lb 3.2 oz)   SpO2 99%   BMI 27.34 kg/m²     LABS:   Lab Results (most recent)       None            IMAGING:   No Images in the past 120 days found..    EXAM:  Physical Exam  Vitals and nursing note reviewed.   Constitutional:       Appearance: He is well-developed.   HENT:      Head: Normocephalic.   Neck:      Thyroid: No thyroid mass.      Vascular: No carotid bruit or JVD.      Trachea: Trachea and phonation normal.   Cardiovascular:      Rate and Rhythm: Normal rate and regular rhythm.      Pulses:           Radial pulses are 2+ on the right side and 2+ on the left side.        Posterior tibial pulses are 2+ on the right side and 2+ on the left side.      Heart sounds: Murmur heard.      No friction rub. No gallop.   Pulmonary:      Effort: Pulmonary effort is normal. No respiratory distress.      Breath sounds: Normal breath sounds. No wheezing or rales.   Musculoskeletal:         General: No swelling. Normal range of motion.      Cervical back: Neck supple.   Skin:     General: Skin is warm and dry.      Capillary Refill: Capillary refill takes less than 2 seconds.      Findings: No rash.   Neurological:      Mental Status: He is alert and oriented to person, place, and time.   Psychiatric:         Speech: Speech normal.         Behavior: Behavior normal.         Thought Content: Thought content normal.         Judgment: Judgment normal. "         Procedure   Procedures       Assessment & Plan    Diagnosis Plan   1. Precordial pain  Stress Test With Myocardial Perfusion One Day    isosorbide mononitrate (IMDUR) 30 MG 24 hr tablet    Comprehensive Metabolic Panel    Lipid Panel    Magnesium      2. Primary hypertension  hydrALAZINE (APRESOLINE) 25 MG tablet    Stress Test With Myocardial Perfusion One Day    Comprehensive Metabolic Panel    Lipid Panel    Magnesium      3. Coronary artery disease of bypass graft of native heart with stable angina pectoris  Stress Test With Myocardial Perfusion One Day    Comprehensive Metabolic Panel    Lipid Panel    Magnesium      4. Chronic fatigue  Stress Test With Myocardial Perfusion One Day    Comprehensive Metabolic Panel    Lipid Panel    Magnesium      1.  Due to patient's history of coronary artery disease with stenting of the LAD as well as the obtuse margin and reoccurrence of chest pain we did discuss reevaluation for ischemia including stress test.  He does wish to move forward with the stress test.    2.  Patient's blood pressure is controlled on current blood pressure medication regimen despite elevated blood pressure today.  No medication changes are warranted at this time.  Patient advised to monitor blood pressure on a daily basis and report any persistent highs or lows.  Set goal blood pressure for patient at 130/80 or below.    3.  Even the most recent EGFR that is available to us is normal.  He reported that his EGFR had dropped again and his primary care had referred him to nephrology.  We will repeat a lipid panel as well as a CMP for further evaluation.    4.  Due to patient's recurrence of chest pain also like to start him on antianginal therapy of isosorbide.    5.  Informed of signs and symptoms of ACS and advised to seek emergent treatment for any new worsening symptoms.  Patient also advised sooner follow-up as needed.  Also advised to follow-up with family doctor as needed  This note is  dictated utilizing voice recognition software.  Although this record has been proof read, transcriptional errors may still be present. If questions occur regarding the content of this record please do not hesitate to call our office.  I have reviewed and confirmed the accuracy of the ROS as documented by the MA/LPN/RN NONI Horton    No follow-ups on file.    Diagnoses and all orders for this visit:    1. Precordial pain (Primary)  -     Stress Test With Myocardial Perfusion One Day; Future  -     isosorbide mononitrate (IMDUR) 30 MG 24 hr tablet; Take 1 tablet by mouth Daily.  Dispense: 30 tablet; Refill: 6  -     Comprehensive Metabolic Panel; Future  -     Lipid Panel; Future  -     Magnesium; Future    2. Primary hypertension  -     hydrALAZINE (APRESOLINE) 25 MG tablet; Take 3 tablets by mouth 3 (Three) Times a Day. 3 tabs TID  Dispense: 270 tablet; Refill: 11  -     Stress Test With Myocardial Perfusion One Day; Future  -     Comprehensive Metabolic Panel; Future  -     Lipid Panel; Future  -     Magnesium; Future    3. Coronary artery disease of bypass graft of native heart with stable angina pectoris  -     Stress Test With Myocardial Perfusion One Day; Future  -     Comprehensive Metabolic Panel; Future  -     Lipid Panel; Future  -     Magnesium; Future    4. Chronic fatigue  -     Stress Test With Myocardial Perfusion One Day; Future  -     Comprehensive Metabolic Panel; Future  -     Lipid Panel; Future  -     Magnesium; Future    Other orders  -     amLODIPine (NORVASC) 10 MG tablet; Take 1 tablet by mouth Daily.  Dispense: 90 tablet; Refill: 3  -     aspirin (Aspirin Adult Low Strength) 81 MG EC tablet; Take 1 tablet by mouth Daily.  Dispense: 90 tablet; Refill: 3  -     atorvastatin (LIPITOR) 40 MG tablet; Take 1 tablet by mouth Daily.  Dispense: 90 tablet; Refill: 3  -     nebivolol (BYSTOLIC) 20 MG tablet; Take 1 tablet by mouth Daily.  Dispense: 90 tablet; Refill: 3  -     nitroglycerin  (NITROSTAT) 0.4 MG SL tablet; Place 1 tablet under the tongue Every 5 (Five) Minutes As Needed for Chest Pain. Take no more than 3 doses in 15 minutes.  Dispense: 30 tablet; Refill: 3  -     prasugrel (EFFIENT) 10 MG tablet; Take 1 tablet by mouth Daily.  Dispense: 90 tablet; Refill: 3  -     valsartan (DIOVAN) 320 MG tablet; Take 1 tablet by mouth Daily.  Dispense: 90 tablet; Refill: 3        Pankaj Nicholas  reports that he has been smoking cigarettes. He has never used smokeless tobacco. I have educated him on the risk of diseases from using tobacco products such as cancer, COPD, and heart disease.     I advised him to quit and he is not willing to quit.    I spent 3  minutes counseling the patient.                       MEDS ORDERED DURING VISIT:  New Medications Ordered This Visit   Medications    amLODIPine (NORVASC) 10 MG tablet     Sig: Take 1 tablet by mouth Daily.     Dispense:  90 tablet     Refill:  3    aspirin (Aspirin Adult Low Strength) 81 MG EC tablet     Sig: Take 1 tablet by mouth Daily.     Dispense:  90 tablet     Refill:  3    atorvastatin (LIPITOR) 40 MG tablet     Sig: Take 1 tablet by mouth Daily.     Dispense:  90 tablet     Refill:  3    hydrALAZINE (APRESOLINE) 25 MG tablet     Sig: Take 3 tablets by mouth 3 (Three) Times a Day. 3 tabs TID     Dispense:  270 tablet     Refill:  11    nebivolol (BYSTOLIC) 20 MG tablet     Sig: Take 1 tablet by mouth Daily.     Dispense:  90 tablet     Refill:  3    nitroglycerin (NITROSTAT) 0.4 MG SL tablet     Sig: Place 1 tablet under the tongue Every 5 (Five) Minutes As Needed for Chest Pain. Take no more than 3 doses in 15 minutes.     Dispense:  30 tablet     Refill:  3    prasugrel (EFFIENT) 10 MG tablet     Sig: Take 1 tablet by mouth Daily.     Dispense:  90 tablet     Refill:  3    valsartan (DIOVAN) 320 MG tablet     Sig: Take 1 tablet by mouth Daily.     Dispense:  90 tablet     Refill:  3    isosorbide mononitrate (IMDUR) 30 MG 24 hr tablet      Sig: Take 1 tablet by mouth Daily.     Dispense:  30 tablet     Refill:  6           This document has been electronically signed by Nilesh Collins Jr., APRN  October 29, 2024 08:46 EDT

## 2024-11-01 ENCOUNTER — TELEPHONE (OUTPATIENT)
Dept: CARDIOLOGY | Facility: CLINIC | Age: 49
End: 2024-11-01
Payer: COMMERCIAL

## 2024-11-01 NOTE — TELEPHONE ENCOUNTER
----- Message from Aziza PHELPS sent at 11/1/2024  6:43 AM EDT -----  Regarding: FW:      ----- Message -----  From: Nilesh Collins APRN  Sent: 10/30/2024  10:29 AM EDT  To: Genny Deng MA  Subject: FW:                                              CMP showed elevated glucose 129, creatinine 1.39, EGFR 62.9.    Lipid panel identified LDL within goal at 46, triglycerides at 94, HDL low at 27    Normal magnesium at 2.1    Keep follow-up  ----- Message -----  From: Lab, Background User  Sent: 10/29/2024   2:31 PM EDT  To: NONI Horton

## 2024-11-01 NOTE — TELEPHONE ENCOUNTER
First attempt to reach pt. Left a voicemail for pt to return my call at [phone].         RELAY    Elevated glucose (sugar)  Elevated CREAT (kidney function)  Routed lab results to PCP.

## 2024-11-22 ENCOUNTER — HOSPITAL ENCOUNTER (OUTPATIENT)
Dept: CARDIOLOGY | Facility: HOSPITAL | Age: 49
Discharge: HOME | End: 2024-11-22
Payer: COMMERCIAL

## 2024-11-22 DIAGNOSIS — R07.2 PRECORDIAL PAIN: ICD-10-CM

## 2024-11-22 DIAGNOSIS — R53.82 CHRONIC FATIGUE: ICD-10-CM

## 2024-11-22 DIAGNOSIS — I25.708 CORONARY ARTERY DISEASE OF BYPASS GRAFT OF NATIVE HEART WITH STABLE ANGINA PECTORIS: ICD-10-CM

## 2024-11-22 DIAGNOSIS — I10 PRIMARY HYPERTENSION: ICD-10-CM

## 2024-11-22 PROCEDURE — 78452 HT MUSCLE IMAGE SPECT MULT: CPT

## 2024-11-22 PROCEDURE — 93017 CV STRESS TEST TRACING ONLY: CPT

## 2024-11-22 PROCEDURE — A9500 TC99M SESTAMIBI: HCPCS | Performed by: INTERNAL MEDICINE

## 2024-11-22 PROCEDURE — 25010000002 REGADENOSON 0.4 MG/5ML SOLUTION: Performed by: INTERNAL MEDICINE

## 2024-11-22 PROCEDURE — 34310000005 TECHNETIUM SESTAMIBI: Performed by: INTERNAL MEDICINE

## 2024-11-22 RX ORDER — REGADENOSON 0.08 MG/ML
0.4 INJECTION, SOLUTION INTRAVENOUS
Status: COMPLETED | OUTPATIENT
Start: 2024-11-22 | End: 2024-11-22

## 2024-11-22 RX ADMIN — REGADENOSON 0.4 MG: 0.08 INJECTION, SOLUTION INTRAVENOUS at 09:33

## 2024-11-22 RX ADMIN — TECHNETIUM TC 99M SESTAMIBI 1 DOSE: 1 INJECTION INTRAVENOUS at 09:33

## 2024-11-22 RX ADMIN — TECHNETIUM TC 99M SESTAMIBI 1 DOSE: 1 INJECTION INTRAVENOUS at 08:05

## 2024-11-23 LAB
BH CV REST NUCLEAR ISOTOPE DOSE: 10 MCI
BH CV STRESS COMMENTS STAGE 1: NORMAL
BH CV STRESS DOSE REGADENOSON STAGE 1: 0.4
BH CV STRESS DURATION MIN STAGE 1: 0
BH CV STRESS DURATION SEC STAGE 1: 10
BH CV STRESS NUCLEAR ISOTOPE DOSE: 30 MCI
BH CV STRESS PROTOCOL 1: NORMAL
BH CV STRESS RECOVERY BP: NORMAL MMHG
BH CV STRESS RECOVERY HR: 56 BPM
BH CV STRESS STAGE 1: 1
MAXIMAL PREDICTED HEART RATE: 173 BPM
PERCENT MAX PREDICTED HR: 49.13 %
STRESS BASELINE BP: NORMAL MMHG
STRESS BASELINE HR: 58 BPM
STRESS PERCENT HR: 58 %
STRESS POST PEAK BP: NORMAL MMHG
STRESS POST PEAK HR: 85 BPM
STRESS TARGET HR: 147 BPM

## 2024-11-26 ENCOUNTER — TELEPHONE (OUTPATIENT)
Dept: CARDIOLOGY | Facility: CLINIC | Age: 49
End: 2024-11-26
Payer: COMMERCIAL

## 2024-11-26 NOTE — TELEPHONE ENCOUNTER
RELAY  STRESS  Called patient to notify of no acute findings or abnormalities. Keep follow up as scheduled. If you have any problem between now and then give our office a call.   ----- Message from Genny MEJIA sent at 11/25/2024 11:00 AM EST -----    ----- Message -----  From: Nilesh Collins APRN  Sent: 11/25/2024   9:43 AM EST  To: Genny Deng MA    Normal stress keep follow up

## 2025-04-29 ENCOUNTER — TELEPHONE (OUTPATIENT)
Dept: CARDIOLOGY | Facility: CLINIC | Age: 50
End: 2025-04-29

## 2025-04-29 ENCOUNTER — OFFICE VISIT (OUTPATIENT)
Dept: CARDIOLOGY | Facility: CLINIC | Age: 50
End: 2025-04-29
Payer: COMMERCIAL

## 2025-04-29 VITALS
HEIGHT: 69 IN | SYSTOLIC BLOOD PRESSURE: 138 MMHG | DIASTOLIC BLOOD PRESSURE: 81 MMHG | HEART RATE: 71 BPM | BODY MASS INDEX: 30.04 KG/M2 | WEIGHT: 202.8 LBS | OXYGEN SATURATION: 97 %

## 2025-04-29 DIAGNOSIS — I25.708 CORONARY ARTERY DISEASE OF BYPASS GRAFT OF NATIVE HEART WITH STABLE ANGINA PECTORIS: ICD-10-CM

## 2025-04-29 DIAGNOSIS — R07.2 PRECORDIAL PAIN: ICD-10-CM

## 2025-04-29 DIAGNOSIS — I10 PRIMARY HYPERTENSION: ICD-10-CM

## 2025-04-29 DIAGNOSIS — I10 PRIMARY HYPERTENSION: Primary | ICD-10-CM

## 2025-04-29 PROCEDURE — 93000 ELECTROCARDIOGRAM COMPLETE: CPT | Performed by: NURSE PRACTITIONER

## 2025-04-29 PROCEDURE — 1159F MED LIST DOCD IN RCRD: CPT | Performed by: NURSE PRACTITIONER

## 2025-04-29 PROCEDURE — 1160F RVW MEDS BY RX/DR IN RCRD: CPT | Performed by: NURSE PRACTITIONER

## 2025-04-29 PROCEDURE — 99213 OFFICE O/P EST LOW 20 MIN: CPT | Performed by: NURSE PRACTITIONER

## 2025-04-29 RX ORDER — VALSARTAN 320 MG/1
320 TABLET ORAL DAILY
Qty: 90 TABLET | Refills: 3 | Status: SHIPPED | OUTPATIENT
Start: 2025-04-29

## 2025-04-29 RX ORDER — ATORVASTATIN CALCIUM 40 MG/1
40 TABLET, FILM COATED ORAL DAILY
Qty: 90 TABLET | Refills: 3 | Status: SHIPPED | OUTPATIENT
Start: 2025-04-29

## 2025-04-29 RX ORDER — NEBIVOLOL 20 MG/1
20 TABLET ORAL DAILY
Qty: 90 TABLET | Refills: 3 | Status: SHIPPED | OUTPATIENT
Start: 2025-04-29

## 2025-04-29 RX ORDER — PRASUGREL 10 MG/1
10 TABLET, FILM COATED ORAL DAILY
Qty: 90 TABLET | Refills: 3 | Status: SHIPPED | OUTPATIENT
Start: 2025-04-29

## 2025-04-29 RX ORDER — HYDRALAZINE HYDROCHLORIDE 25 MG/1
75 TABLET, FILM COATED ORAL 2 TIMES DAILY
Qty: 540 TABLET | Refills: 3 | Status: SHIPPED | OUTPATIENT
Start: 2025-04-29

## 2025-04-29 RX ORDER — HYDRALAZINE HYDROCHLORIDE 25 MG/1
75 TABLET, FILM COATED ORAL 2 TIMES DAILY
Qty: 540 TABLET | Refills: 3 | Status: SHIPPED | OUTPATIENT
Start: 2025-04-29 | End: 2025-04-29 | Stop reason: SDUPTHER

## 2025-04-29 RX ORDER — ISOSORBIDE MONONITRATE 30 MG/1
30 TABLET, EXTENDED RELEASE ORAL DAILY
Qty: 90 TABLET | Refills: 3 | Status: SHIPPED | OUTPATIENT
Start: 2025-04-29

## 2025-04-29 RX ORDER — AMLODIPINE BESYLATE 10 MG/1
10 TABLET ORAL DAILY
Qty: 90 TABLET | Refills: 3 | Status: SHIPPED | OUTPATIENT
Start: 2025-04-29

## 2025-04-29 RX ORDER — HYDROXYZINE HYDROCHLORIDE 10 MG/5ML
4 SYRUP ORAL 2 TIMES DAILY
COMMUNITY

## 2025-04-29 NOTE — PROGRESS NOTES
Subjective     Pankaj Nicholas is a 50 y.o. male who presents to day for stress test follow up  and Hypertension.    CHIEF COMPLIANT  Chief Complaint   Patient presents with    stress test follow up     Hypertension       Active Problems:  Problem List Items Addressed This Visit    None  Visit Diagnoses         Primary hypertension    -  Primary    Relevant Medications    amLODIPine (NORVASC) 10 MG tablet    nebivolol (BYSTOLIC) 20 MG tablet    valsartan (DIOVAN) 320 MG tablet    hydrALAZINE (APRESOLINE) 25 MG tablet      Coronary artery disease of bypass graft of native heart with stable angina pectoris        Relevant Medications    amLODIPine (NORVASC) 10 MG tablet    isosorbide mononitrate (IMDUR) 30 MG 24 hr tablet    nebivolol (BYSTOLIC) 20 MG tablet    prasugrel (EFFIENT) 10 MG tablet      Precordial pain        Relevant Medications    isosorbide mononitrate (IMDUR) 30 MG 24 hr tablet        Problem list  1.  Hypertension  Echocardiogram 3/22: EF 65 to 70%, grade 2 diastolic dysfunction, moderate to severe dominantly concentric left ventricular hypertrophy with small septal ridge immediately below the LV outflow tract.  Mild amount of [place] mild MR physiological TR  1.2 cardiac MRI: Apical variant hypertrophic cardiomyopathy, EF 63%  2.  Heart murmur  3.  Shortness of breath  4.  Left heart catheterization 4/2023 left main patent, LAD proximal 95%, OM1 70%, circumflex 30 to 40%, RCA mid mild disease, LVEDP 36, stent placement to the LAD and OM1  4.1 stress test 11/24: Negative for ischemia, post-rest EF 52%       HPI  HPI  Pankaj Nicholas is a 50-year-old male patient who is being followed up today for chronic arterial hypertension and coronary artery disease.     Patient does have a history of chronic arterial hypertension in which she is being treated with amlodipine hydralazine nebivolol and valsartan.  Today's blood pressure is 141/75 heart rate of 65.  He says his blood pressure typically runs about 125  systolic at home.     Patient does have coronary artery disease in which she did undergo left heart catheterization in April 2023 that showed a patent left main, LAD proximal 95%, %, circumflex 30 to 40%, RCA mid mild disease, LVEDP was 36.  Patient received stents to the LAD and OM1 at that time.  He is on Effient and aspirin for dual antiplatelet therapy atorvastatin for high intensity statin therapy.    Patient did go under stress test that indicated no scintigraphic evidence of ischemia with a mildly depressed post-rest ejection fraction of 52%.  We did discuss this in detail.    Patient does report some swelling in his lower extremities when he was given a medication for leg cramps.  He says that the leg cramps resolved he stopped the medication and so did the swelling.  He has no swelling today or reoccurrence to his knowledge.    Overall patient states that he is doing well from the cardiovascular standpoint.  He denies any significant angina anginal equivalent symptoms.  Patient denies any chest pain, shortness of breath, palpitations, lower extremity edema, fatigue, syncope, PND, orthopnea, or strokelike symptoms.  PRIOR MEDS  Current Outpatient Medications on File Prior to Visit   Medication Sig Dispense Refill    aspirin (Aspirin Adult Low Strength) 81 MG EC tablet Take 1 tablet by mouth Daily. 90 tablet 3    Beclomethasone Dipropionate (QVAR IN) Inhale.      chlorpheniramine (CHLOR-TRIMETON) 4 MG tablet Take 1 tablet by mouth 2 (Two) Times a Day.      nitroglycerin (NITROSTAT) 0.4 MG SL tablet Place 1 tablet under the tongue Every 5 (Five) Minutes As Needed for Chest Pain. Take no more than 3 doses in 15 minutes. 30 tablet 3    [DISCONTINUED] amLODIPine (NORVASC) 10 MG tablet Take 1 tablet by mouth Daily. 90 tablet 3    [DISCONTINUED] atorvastatin (LIPITOR) 40 MG tablet Take 1 tablet by mouth Daily. 90 tablet 3    [DISCONTINUED] hydrALAZINE (APRESOLINE) 25 MG tablet Take 3 tablets by mouth 3  (Three) Times a Day. 3 tabs  tablet 11    [DISCONTINUED] isosorbide mononitrate (IMDUR) 30 MG 24 hr tablet Take 1 tablet by mouth Daily. 30 tablet 6    [DISCONTINUED] nebivolol (BYSTOLIC) 20 MG tablet Take 1 tablet by mouth Daily. 90 tablet 3    [DISCONTINUED] prasugrel (EFFIENT) 10 MG tablet Take 1 tablet by mouth Daily. 90 tablet 3    [DISCONTINUED] valsartan (DIOVAN) 320 MG tablet Take 1 tablet by mouth Daily. 90 tablet 3    [DISCONTINUED] cetirizine (zyrTEC) 10 MG tablet Take 1 tablet by mouth Daily.       No current facility-administered medications on file prior to visit.       ALLERGIES  Patient has no known allergies.    HISTORY  Past Medical History:   Diagnosis Date    Asthma     Heart murmur     Hypertension        Social History     Socioeconomic History    Marital status:    Tobacco Use    Smoking status: Every Day     Types: Cigarettes    Smokeless tobacco: Never    Tobacco comments:     Patient has cut his smoking in half and is currently trying to quit.   Vaping Use    Vaping status: Never Used   Substance and Sexual Activity    Drug use: Never    Sexual activity: Never       Family History   Problem Relation Age of Onset    Hypertension Mother     Hypertension Father        Review of Systems   Constitutional:  Negative for chills, fatigue and fever.   HENT:  Positive for rhinorrhea. Negative for congestion and sore throat.    Eyes:  Negative for visual disturbance.   Respiratory:  Negative for apnea, chest tightness and shortness of breath.    Cardiovascular:  Positive for leg swelling (feet and legs swell sometimes was medication related has resolved). Negative for chest pain and palpitations.   Gastrointestinal:  Negative for constipation, diarrhea and nausea.   Musculoskeletal:  Positive for back pain. Negative for arthralgias and neck pain.   Skin:  Negative for rash and wound.   Allergic/Immunologic: Positive for environmental allergies. Negative for food allergies.  "  Neurological:  Positive for weakness (occasional). Negative for dizziness, syncope and light-headedness.   Hematological:  Bruises/bleeds easily (bleeds easy).   Psychiatric/Behavioral:  Negative for sleep disturbance.        Objective     VITALS: /81 (BP Location: Left arm, Patient Position: Sitting, Cuff Size: Adult)   Pulse 71   Ht 175.3 cm (69.02\")   Wt 92 kg (202 lb 12.8 oz)   SpO2 97%   BMI 29.93 kg/m²     LABS:   Lab Results (most recent)       None            IMAGING:   No Images in the past 120 days found..    EXAM:  Physical Exam  Vitals and nursing note reviewed.   Constitutional:       Appearance: He is well-developed.   HENT:      Head: Normocephalic.   Neck:      Thyroid: No thyroid mass.      Vascular: Carotid bruit present. No JVD.      Trachea: Trachea and phonation normal.   Cardiovascular:      Rate and Rhythm: Normal rate and regular rhythm.      Pulses:           Radial pulses are 2+ on the right side and 2+ on the left side.        Posterior tibial pulses are 2+ on the right side and 2+ on the left side.      Heart sounds: Murmur heard.      No friction rub. No gallop.   Pulmonary:      Effort: Pulmonary effort is normal. No respiratory distress.      Breath sounds: Normal breath sounds. No wheezing or rales.   Musculoskeletal:         General: No swelling. Normal range of motion.      Cervical back: Neck supple.   Skin:     General: Skin is warm and dry.      Capillary Refill: Capillary refill takes less than 2 seconds.      Findings: No rash.   Neurological:      Mental Status: He is alert and oriented to person, place, and time.   Psychiatric:         Speech: Speech normal.         Behavior: Behavior normal.         Thought Content: Thought content normal.         Judgment: Judgment normal.         Procedure     ECG 12 Lead    Date/Time: 4/29/2025 9:21 AM  Performed by: Nilesh Collins APRN    Authorized by: Nilesh Collins APRN  Comparison: compared with previous ECG from " 4/29/2024  Similar to previous ECG  Rhythm: sinus rhythm  Rate: normal  BPM: 67  ST Depression: II, I, V3, V4, V5 and V6  QRS axis: normal  Other findings: left ventricular hypertrophy  Comments: QTc 461 ms             Assessment & Plan    Diagnosis Plan   1. Primary hypertension  hydrALAZINE (APRESOLINE) 25 MG tablet      2. Coronary artery disease of bypass graft of native heart with stable angina pectoris        3. Precordial pain  isosorbide mononitrate (IMDUR) 30 MG 24 hr tablet      1.  Patient's blood pressure is controlled on current blood pressure medication regimen.  Due to frequently forgetting the third dose of hydralazine patient does request that it to be reduced to twice daily..  Patient advised to monitor blood pressure on a daily basis and report any persistent highs or lows.  Set goal blood pressure for patient at 130/80 or below.  2.  Patient does have a history of coronary artery disease which seems to be stable.  Will continue his current medications without change.  3.  Informed of signs and symptoms of ACS and advised to seek emergent treatment for any new worsening symptoms.  Patient also advised sooner follow-up as needed.  Also advised to follow-up with family doctor as needed  This note is dictated utilizing voice recognition software.  Although this record has been proof read, transcriptional errors may still be present. If questions occur regarding the content of this record please do not hesitate to call our office.  I have reviewed and confirmed the accuracy of the ROS as documented by the MA/LPN/RN NONI Horton    Return in about 6 months (around 10/29/2025), or if symptoms worsen or fail to improve.    Diagnoses and all orders for this visit:    1. Primary hypertension (Primary)  -     hydrALAZINE (APRESOLINE) 25 MG tablet; Take 3 tablets by mouth 2 (Two) Times a Day. 3 tabs TID  Dispense: 540 tablet; Refill: 3    2. Coronary artery disease of bypass graft of native heart  with stable angina pectoris    3. Precordial pain  -     isosorbide mononitrate (IMDUR) 30 MG 24 hr tablet; Take 1 tablet by mouth Daily.  Dispense: 90 tablet; Refill: 3    Other orders  -     ECG 12 Lead  -     amLODIPine (NORVASC) 10 MG tablet; Take 1 tablet by mouth Daily.  Dispense: 90 tablet; Refill: 3  -     atorvastatin (LIPITOR) 40 MG tablet; Take 1 tablet by mouth Daily.  Dispense: 90 tablet; Refill: 3  -     nebivolol (BYSTOLIC) 20 MG tablet; Take 1 tablet by mouth Daily.  Dispense: 90 tablet; Refill: 3  -     prasugrel (EFFIENT) 10 MG tablet; Take 1 tablet by mouth Daily.  Dispense: 90 tablet; Refill: 3  -     valsartan (DIOVAN) 320 MG tablet; Take 1 tablet by mouth Daily.  Dispense: 90 tablet; Refill: 3        Pankaj Nicholas  reports that he has been smoking cigarettes. He has never used smokeless tobacco. I have educated him on the risk of diseases from using tobacco products such as cancer, COPD, and heart disease.     I advised him to quit and he is willing to quit. Patient is actively trying to quit. He has cut back his smoking to half of his previous amount.   I spent 5.5 minutes counseling the patient.           DO YOU VAPE OR USE SMOKELESS TOBACCO PRODUCTS?Patient does not use smokeless tobacco or vape.    BMI is >= 25 and <30. (Overweight) The following options were offered after discussion;: exercise counseling/recommendations and nutrition counseling/recommendations           MEDS ORDERED DURING VISIT:  New Medications Ordered This Visit   Medications    amLODIPine (NORVASC) 10 MG tablet     Sig: Take 1 tablet by mouth Daily.     Dispense:  90 tablet     Refill:  3    atorvastatin (LIPITOR) 40 MG tablet     Sig: Take 1 tablet by mouth Daily.     Dispense:  90 tablet     Refill:  3    isosorbide mononitrate (IMDUR) 30 MG 24 hr tablet     Sig: Take 1 tablet by mouth Daily.     Dispense:  90 tablet     Refill:  3    nebivolol (BYSTOLIC) 20 MG tablet     Sig: Take 1 tablet by mouth Daily.      Dispense:  90 tablet     Refill:  3    prasugrel (EFFIENT) 10 MG tablet     Sig: Take 1 tablet by mouth Daily.     Dispense:  90 tablet     Refill:  3    valsartan (DIOVAN) 320 MG tablet     Sig: Take 1 tablet by mouth Daily.     Dispense:  90 tablet     Refill:  3    hydrALAZINE (APRESOLINE) 25 MG tablet     Sig: Take 3 tablets by mouth 2 (Two) Times a Day. 3 tabs TID     Dispense:  540 tablet     Refill:  3           This document has been electronically signed by Nilesh Collins Jr., NONI  April 29, 2025 09:47 EDT

## 2025-04-29 NOTE — TELEPHONE ENCOUNTER
[facility] called to clarify the hydralazine rx. The prescription has two sets of instructions and they would like to know which instructions are correct. Please advise. Call Tonny Hunt back at  [phone]